# Patient Record
Sex: MALE | Race: WHITE | Employment: FULL TIME | URBAN - METROPOLITAN AREA
[De-identification: names, ages, dates, MRNs, and addresses within clinical notes are randomized per-mention and may not be internally consistent; named-entity substitution may affect disease eponyms.]

---

## 2019-01-25 ENCOUNTER — HOSPITAL ENCOUNTER (EMERGENCY)
Age: 24
Discharge: HOME | End: 2019-01-25
Payer: COMMERCIAL

## 2019-01-25 ENCOUNTER — APPOINTMENT (OUTPATIENT)
Dept: GENERAL RADIOLOGY | Age: 24
End: 2019-01-25
Payer: COMMERCIAL

## 2019-01-25 VITALS
BODY MASS INDEX: 29.62 KG/M2 | DIASTOLIC BLOOD PRESSURE: 78 MMHG | HEART RATE: 68 BPM | OXYGEN SATURATION: 98 % | TEMPERATURE: 97.8 F | WEIGHT: 200 LBS | RESPIRATION RATE: 16 BRPM | SYSTOLIC BLOOD PRESSURE: 132 MMHG | HEIGHT: 69 IN

## 2019-01-25 DIAGNOSIS — M25.552 LEFT HIP PAIN: Primary | ICD-10-CM

## 2019-01-25 PROCEDURE — 99283 EMERGENCY DEPT VISIT LOW MDM: CPT

## 2019-01-25 PROCEDURE — 73502 X-RAY EXAM HIP UNI 2-3 VIEWS: CPT

## 2019-01-25 RX ORDER — IBUPROFEN 800 MG/1
800 TABLET ORAL EVERY 8 HOURS PRN
Qty: 12 TABLET | Refills: 0 | Status: ON HOLD | OUTPATIENT
Start: 2019-01-25 | End: 2019-07-18 | Stop reason: ALTCHOICE

## 2019-01-25 RX ORDER — OLANZAPINE 10 MG/1
10 TABLET ORAL NIGHTLY
Status: ON HOLD | COMMUNITY
End: 2019-07-18 | Stop reason: ALTCHOICE

## 2019-01-25 RX ORDER — PAROXETINE 10 MG/1
10 TABLET, FILM COATED ORAL EVERY MORNING
COMMUNITY
End: 2019-07-17

## 2019-01-25 ASSESSMENT — PAIN SCALES - GENERAL: PAINLEVEL_OUTOF10: 8

## 2019-01-25 ASSESSMENT — PAIN DESCRIPTION - PAIN TYPE: TYPE: ACUTE PAIN

## 2019-01-25 ASSESSMENT — PAIN DESCRIPTION - DESCRIPTORS: DESCRIPTORS: ACHING

## 2019-01-25 ASSESSMENT — PAIN SCALES - WONG BAKER: WONGBAKER_NUMERICALRESPONSE: 2

## 2019-01-25 ASSESSMENT — PAIN DESCRIPTION - ORIENTATION: ORIENTATION: LEFT

## 2019-01-25 ASSESSMENT — PAIN DESCRIPTION - LOCATION: LOCATION: HIP

## 2019-01-25 NOTE — ED PROVIDER NOTES
HPI:  1/25/19,   Time: 8:23 AM         Gaudencio Pacheco is a 26 y.o. male presenting to the ED for left hip pain, beginning prior to arrival ago. The complaint has been persistent, moderate in severity, and worsened by walking. States that he was at work. 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 Hurts to walk. Having pain to the area. Denies head injury, neck pain, loss of consciousness, back pain. He is ambulatory but states it does hurt. ROS:   Pertinent positives and negatives are stated within HPI, all other systems reviewed and are negative.  --------------------------------------------- PAST HISTORY ---------------------------------------------  Past Medical History:  has a past medical history of Depression. Past Surgical History:  has no past surgical history on file. Social History:  reports that he has been smoking. He has never used smokeless tobacco. He reports that he uses drugs, including Marijuana. He reports that he does not drink alcohol. Family History: family history is not on file. The patients home medications have been reviewed. Allergies: Patient has no known allergies. -------------------------------------------------- RESULTS -------------------------------------------------  All laboratory and radiology results have been personally reviewed by myself   LABS:  No results found for this visit on 01/25/19. RADIOLOGY:  Interpreted by Radiologist.  XR HIP 2-3 VW W PELVIS LEFT   Final Result   No acute osseous abnormality.           ------------------------- NURSING NOTES AND VITALS REVIEWED ---------------------------   The nursing notes within the ED encounter and vital signs as below have been reviewed.    /78   Pulse 68   Temp 97.8 °F (36.6 °C) (Oral)   Resp 16   Ht 5' 9\" (1.753 m)   Wt 200 lb (90.7 kg)   SpO2 98%   BMI 29.53 kg/m²   Oxygen Saturation Interpretation: Normal      ---------------------------------------------------PHYSICAL EXAM--------------------------------------      Constitutional/General: Alert and oriented x3, well appearing, non toxic in NAD  Head: NC/AT  Eyes: PERRL, EOMI  Mouth: Oropharynx clear, handling secretions, no trismus  Neck: Supple, full ROM, no meningeal signs  Pulmonary: Lungs clear to auscultation bilaterally, no wheezes, rales, or rhonchi. Not in respiratory distress  Cardiovascular:  Regular rate and rhythm, no murmurs, gallops, or rubs. 2+ distal pulses  Abdomen: Soft, non tender, non distended,   Extremities: Moves all extremities x 4. Warm and well perfused. Pain to palpation of the left lateral hip. ROM of the left hip is intact. Distal pulses are + 2 bilaterally. Back: Negative cervical, thoracic or lumbar vertebral tenderness. Skin: warm and dry without rash  Neurologic: GCS 15,  Psych: Normal Affect      ------------------------------ ED COURSE/MEDICAL DECISION MAKING----------------------  Medications - No data to display      Medical Decision Making:    She was seen independently. Results reviewed with the patient. He will follow up at work with light duty. He will see for upper care. Warning signs discussed with the patient. She'll return for any worsening symptoms. Counseling: The emergency provider has spoken with the patient and discussed todays results, in addition to providing specific details for the plan of care and counseling regarding the diagnosis and prognosis. Questions are answered at this time and they are agreeable with the plan.      --------------------------------- IMPRESSION AND DISPOSITION ---------------------------------    IMPRESSION  1.  Left hip pain New Problem       DISPOSITION  Disposition: Discharge to home  Patient condition is stable                 Rehana Philip [place]  01/25/19 7115

## 2019-06-17 ENCOUNTER — HOSPITAL ENCOUNTER (OUTPATIENT)
Age: 24
Discharge: HOME | End: 2019-06-19
Payer: COMMERCIAL

## 2019-06-17 ENCOUNTER — HOSPITAL ENCOUNTER (OUTPATIENT)
Dept: ULTRASOUND IMAGING | Age: 24
Discharge: HOME | End: 2019-06-19
Payer: COMMERCIAL

## 2019-06-17 DIAGNOSIS — R35.0 URINARY FREQUENCY: ICD-10-CM

## 2019-06-17 PROCEDURE — 76770 US EXAM ABDO BACK WALL COMP: CPT

## 2019-07-17 ENCOUNTER — HOSPITAL ENCOUNTER (EMERGENCY)
Age: 24
Discharge: NURSING, REHAB OR LONG-TERM CARE | End: 2019-07-17
Attending: EMERGENCY MEDICINE
Payer: COMMERCIAL

## 2019-07-17 ENCOUNTER — HOSPITAL ENCOUNTER (OUTPATIENT)
Age: 24
Discharge: HOME | End: 2019-07-17
Payer: COMMERCIAL

## 2019-07-17 VITALS
RESPIRATION RATE: 18 BRPM | HEIGHT: 66 IN | WEIGHT: 200 LBS | HEART RATE: 102 BPM | OXYGEN SATURATION: 100 % | SYSTOLIC BLOOD PRESSURE: 114 MMHG | DIASTOLIC BLOOD PRESSURE: 59 MMHG | BODY MASS INDEX: 32.14 KG/M2 | TEMPERATURE: 99.2 F

## 2019-07-17 DIAGNOSIS — T43.292A BUPROPION OVERDOSE, INTENTIONAL SELF-HARM, INITIAL ENCOUNTER (HCC): Primary | ICD-10-CM

## 2019-07-17 DIAGNOSIS — T43.222A INTENTIONAL OVERDOSE OF SELECTIVE SEROTONIN REUPTAKE INHIBITOR (SSRI), INITIAL ENCOUNTER (HCC): ICD-10-CM

## 2019-07-17 DIAGNOSIS — T14.91XA SUICIDE ATTEMPT (HCC): ICD-10-CM

## 2019-07-17 PROBLEM — T50.902A DRUG OVERDOSE, INTENTIONAL SELF-HARM, INITIAL ENCOUNTER (HCC): Status: ACTIVE | Noted: 2019-07-17

## 2019-07-17 LAB
ALBUMIN SERPL-MCNC: 4.7 G/DL (ref 3.5–5.2)
ALP BLD-CCNC: 75 U/L (ref 40–129)
ALT SERPL-CCNC: 14 U/L (ref 0–40)
AMPHETAMINE SCREEN, URINE: NOT DETECTED
ANION GAP SERPL CALCULATED.3IONS-SCNC: 11 MMOL/L (ref 7–16)
AST SERPL-CCNC: 18 U/L (ref 0–39)
BARBITURATE SCREEN URINE: NOT DETECTED
BASOPHILS ABSOLUTE: 0.11 E9/L (ref 0–0.2)
BASOPHILS RELATIVE PERCENT: 1.6 % (ref 0–2)
BENZODIAZEPINE SCREEN, URINE: NOT DETECTED
BILIRUB SERPL-MCNC: 0.5 MG/DL (ref 0–1.2)
BILIRUBIN URINE: NEGATIVE
BLOOD, URINE: NEGATIVE
BUN BLDV-MCNC: 18 MG/DL (ref 6–20)
CALCIUM SERPL-MCNC: 9.3 MG/DL (ref 8.6–10.2)
CANNABINOID SCREEN URINE: NOT DETECTED
CHLORIDE BLD-SCNC: 98 MMOL/L (ref 98–107)
CLARITY: CLEAR
CO2: 28 MMOL/L (ref 22–29)
COCAINE METABOLITE SCREEN URINE: NOT DETECTED
COLOR: YELLOW
CREAT SERPL-MCNC: 1.1 MG/DL (ref 0.7–1.2)
EOSINOPHILS ABSOLUTE: 0.2 E9/L (ref 0.05–0.5)
EOSINOPHILS RELATIVE PERCENT: 2.8 % (ref 0–6)
GFR AFRICAN AMERICAN: >60
GFR NON-AFRICAN AMERICAN: >60 ML/MIN/1.73
GLUCOSE BLD-MCNC: 89 MG/DL (ref 74–99)
GLUCOSE URINE: NEGATIVE MG/DL
HCT VFR BLD CALC: 43.2 % (ref 37–54)
HEMOGLOBIN: 14.6 G/DL (ref 12.5–16.5)
IMMATURE GRANULOCYTES #: 0.02 E9/L
IMMATURE GRANULOCYTES %: 0.3 % (ref 0–5)
KETONES, URINE: NEGATIVE MG/DL
LEUKOCYTE ESTERASE, URINE: NEGATIVE
LYMPHOCYTES ABSOLUTE: 2.15 E9/L (ref 1.5–4)
LYMPHOCYTES RELATIVE PERCENT: 30.5 % (ref 20–42)
MAGNESIUM: 2 MG/DL (ref 1.6–2.6)
MCH RBC QN AUTO: 32.2 PG (ref 26–35)
MCHC RBC AUTO-ENTMCNC: 33.8 % (ref 32–34.5)
MCV RBC AUTO: 95.2 FL (ref 80–99.9)
METHADONE SCREEN, URINE: NOT DETECTED
MONOCYTES ABSOLUTE: 0.76 E9/L (ref 0.1–0.95)
MONOCYTES RELATIVE PERCENT: 10.8 % (ref 2–12)
NEUTROPHILS ABSOLUTE: 3.8 E9/L (ref 1.8–7.3)
NEUTROPHILS RELATIVE PERCENT: 54 % (ref 43–80)
NITRITE, URINE: NEGATIVE
OPIATE SCREEN URINE: NOT DETECTED
PDW BLD-RTO: 12.4 FL (ref 11.5–15)
PH UA: 7 (ref 5–9)
PHENCYCLIDINE SCREEN URINE: NOT DETECTED
PLATELET # BLD: 227 E9/L (ref 130–450)
PMV BLD AUTO: 10.3 FL (ref 7–12)
POTASSIUM SERPL-SCNC: 3.8 MMOL/L (ref 3.5–5)
PROPOXYPHENE SCREEN: NOT DETECTED
PROTEIN UA: NEGATIVE MG/DL
RBC # BLD: 4.54 E12/L (ref 3.8–5.8)
SODIUM BLD-SCNC: 137 MMOL/L (ref 132–146)
SPECIFIC GRAVITY UA: 1.02 (ref 1–1.03)
TOTAL PROTEIN: 7.1 G/DL (ref 6.4–8.3)
TROPONIN: <0.01 NG/ML (ref 0–0.03)
UROBILINOGEN, URINE: 0.2 E.U./DL
WBC # BLD: 7 E9/L (ref 4.5–11.5)

## 2019-07-17 PROCEDURE — 93005 ELECTROCARDIOGRAM TRACING: CPT | Performed by: EMERGENCY MEDICINE

## 2019-07-17 PROCEDURE — 80307 DRUG TEST PRSMV CHEM ANLYZR: CPT

## 2019-07-17 PROCEDURE — 83735 ASSAY OF MAGNESIUM: CPT

## 2019-07-17 PROCEDURE — 85025 COMPLETE CBC W/AUTO DIFF WBC: CPT

## 2019-07-17 PROCEDURE — 6360000002 HC RX W HCPCS: Performed by: EMERGENCY MEDICINE

## 2019-07-17 PROCEDURE — 80053 COMPREHEN METABOLIC PANEL: CPT

## 2019-07-17 PROCEDURE — 99291 CRITICAL CARE FIRST HOUR: CPT

## 2019-07-17 PROCEDURE — 84484 ASSAY OF TROPONIN QUANT: CPT

## 2019-07-17 PROCEDURE — 96374 THER/PROPH/DIAG INJ IV PUSH: CPT

## 2019-07-17 PROCEDURE — A0426 ALS 1: HCPCS

## 2019-07-17 PROCEDURE — G0480 DRUG TEST DEF 1-7 CLASSES: HCPCS

## 2019-07-17 PROCEDURE — A0425 GROUND MILEAGE: HCPCS

## 2019-07-17 PROCEDURE — 81003 URINALYSIS AUTO W/O SCOPE: CPT

## 2019-07-17 PROCEDURE — 96376 TX/PRO/DX INJ SAME DRUG ADON: CPT

## 2019-07-17 PROCEDURE — 2580000003 HC RX 258: Performed by: EMERGENCY MEDICINE

## 2019-07-17 RX ORDER — 0.9 % SODIUM CHLORIDE 0.9 %
1000 INTRAVENOUS SOLUTION INTRAVENOUS ONCE
Status: COMPLETED | OUTPATIENT
Start: 2019-07-17 | End: 2019-07-17

## 2019-07-17 RX ORDER — LORAZEPAM 2 MG/ML
1 INJECTION INTRAMUSCULAR ONCE
Status: COMPLETED | OUTPATIENT
Start: 2019-07-17 | End: 2019-07-17

## 2019-07-17 RX ORDER — BUPROPION HYDROCHLORIDE 150 MG/1
150 TABLET ORAL EVERY EVENING
Status: ON HOLD | COMMUNITY
End: 2019-07-24 | Stop reason: HOSPADM

## 2019-07-17 RX ORDER — CITALOPRAM 20 MG/1
20 TABLET ORAL DAILY
Status: ON HOLD | COMMUNITY
End: 2019-07-24 | Stop reason: HOSPADM

## 2019-07-17 RX ORDER — SODIUM CHLORIDE 0.9 % (FLUSH) 0.9 %
10 SYRINGE (ML) INJECTION PRN
Status: DISCONTINUED | OUTPATIENT
Start: 2019-07-17 | End: 2019-07-18 | Stop reason: HOSPADM

## 2019-07-17 RX ADMIN — LORAZEPAM 1 MG: 2 INJECTION INTRAMUSCULAR; INTRAVENOUS at 21:43

## 2019-07-17 RX ADMIN — LORAZEPAM 1 MG: 2 INJECTION INTRAMUSCULAR; INTRAVENOUS at 23:02

## 2019-07-17 RX ADMIN — SODIUM CHLORIDE 1000 ML: 9 INJECTION, SOLUTION INTRAVENOUS at 21:43

## 2019-07-17 RX ADMIN — Medication 10 ML: at 23:03

## 2019-07-18 ENCOUNTER — HOSPITAL ENCOUNTER (INPATIENT)
Age: 24
LOS: 1 days | Discharge: PSYCHIATRIC OR SUBSTANCE USE FACILITY | DRG: 918 | End: 2019-07-19
Attending: INTERNAL MEDICINE | Admitting: INTERNAL MEDICINE
Payer: COMMERCIAL

## 2019-07-18 LAB
ACETAMINOPHEN LEVEL: <5 MCG/ML (ref 10–30)
ALBUMIN SERPL-MCNC: 4.6 G/DL (ref 3.5–5.2)
ALBUMIN SERPL-MCNC: 4.6 G/DL (ref 3.5–5.2)
ALP BLD-CCNC: 64 U/L (ref 40–129)
ALP BLD-CCNC: 65 U/L (ref 40–129)
ALT SERPL-CCNC: 14 U/L (ref 0–40)
ALT SERPL-CCNC: 16 U/L (ref 0–40)
ANION GAP SERPL CALCULATED.3IONS-SCNC: 10 MMOL/L (ref 7–16)
ANION GAP SERPL CALCULATED.3IONS-SCNC: 11 MMOL/L (ref 7–16)
ANION GAP SERPL CALCULATED.3IONS-SCNC: 14 MMOL/L (ref 7–16)
ANION GAP SERPL CALCULATED.3IONS-SCNC: 7 MMOL/L (ref 7–16)
AST SERPL-CCNC: 17 U/L (ref 0–39)
AST SERPL-CCNC: 17 U/L (ref 0–39)
BASOPHILS ABSOLUTE: 0.08 E9/L (ref 0–0.2)
BASOPHILS RELATIVE PERCENT: 0.9 % (ref 0–2)
BILIRUB SERPL-MCNC: 0.6 MG/DL (ref 0–1.2)
BILIRUB SERPL-MCNC: 0.9 MG/DL (ref 0–1.2)
BUN BLDV-MCNC: 12 MG/DL (ref 6–20)
BUN BLDV-MCNC: 12 MG/DL (ref 6–20)
BUN BLDV-MCNC: 16 MG/DL (ref 6–20)
BUN BLDV-MCNC: 20 MG/DL (ref 6–20)
CALCIUM IONIZED: 1.05 MMOL/L (ref 1.15–1.33)
CALCIUM SERPL-MCNC: 6.1 MG/DL (ref 8.6–10.2)
CALCIUM SERPL-MCNC: 6.8 MG/DL (ref 8.6–10.2)
CALCIUM SERPL-MCNC: 9 MG/DL (ref 8.6–10.2)
CALCIUM SERPL-MCNC: 9.1 MG/DL (ref 8.6–10.2)
CHLORIDE BLD-SCNC: 103 MMOL/L (ref 98–107)
CHLORIDE BLD-SCNC: 106 MMOL/L (ref 98–107)
CHLORIDE BLD-SCNC: 117 MMOL/L (ref 98–107)
CHLORIDE BLD-SCNC: 117 MMOL/L (ref 98–107)
CO2: 23 MMOL/L (ref 22–29)
CO2: 28 MMOL/L (ref 22–29)
CREAT SERPL-MCNC: 0.8 MG/DL (ref 0.7–1.2)
CREAT SERPL-MCNC: 0.9 MG/DL (ref 0.7–1.2)
CREAT SERPL-MCNC: 1.1 MG/DL (ref 0.7–1.2)
CREAT SERPL-MCNC: 1.1 MG/DL (ref 0.7–1.2)
EOSINOPHILS ABSOLUTE: 0.03 E9/L (ref 0.05–0.5)
EOSINOPHILS RELATIVE PERCENT: 0.3 % (ref 0–6)
ETHANOL: <10 MG/DL (ref 0–0.08)
GFR AFRICAN AMERICAN: >60
GFR NON-AFRICAN AMERICAN: >60 ML/MIN/1.73
GLUCOSE BLD-MCNC: 100 MG/DL (ref 74–99)
GLUCOSE BLD-MCNC: 102 MG/DL (ref 74–99)
GLUCOSE BLD-MCNC: 114 MG/DL (ref 74–99)
GLUCOSE BLD-MCNC: 79 MG/DL (ref 74–99)
HCT VFR BLD CALC: 43.7 % (ref 37–54)
HEMOGLOBIN: 14.9 G/DL (ref 12.5–16.5)
IMMATURE GRANULOCYTES #: 0.03 E9/L
IMMATURE GRANULOCYTES %: 0.3 % (ref 0–5)
LYMPHOCYTES ABSOLUTE: 1.29 E9/L (ref 1.5–4)
LYMPHOCYTES RELATIVE PERCENT: 15 % (ref 20–42)
MAGNESIUM: 1.6 MG/DL (ref 1.6–2.6)
MAGNESIUM: 1.6 MG/DL (ref 1.6–2.6)
MAGNESIUM: 2 MG/DL (ref 1.6–2.6)
MCH RBC QN AUTO: 31.9 PG (ref 26–35)
MCHC RBC AUTO-ENTMCNC: 34.1 % (ref 32–34.5)
MCV RBC AUTO: 93.6 FL (ref 80–99.9)
MONOCYTES ABSOLUTE: 0.54 E9/L (ref 0.1–0.95)
MONOCYTES RELATIVE PERCENT: 6.3 % (ref 2–12)
NEUTROPHILS ABSOLUTE: 6.61 E9/L (ref 1.8–7.3)
NEUTROPHILS RELATIVE PERCENT: 77.2 % (ref 43–80)
PDW BLD-RTO: 12.3 FL (ref 11.5–15)
PHOSPHORUS: 3 MG/DL (ref 2.5–4.5)
PHOSPHORUS: 3.3 MG/DL (ref 2.5–4.5)
PLATELET # BLD: 230 E9/L (ref 130–450)
PMV BLD AUTO: 9.9 FL (ref 7–12)
POTASSIUM REFLEX MAGNESIUM: 4.3 MMOL/L (ref 3.5–5)
POTASSIUM REFLEX MAGNESIUM: 4.3 MMOL/L (ref 3.5–5)
POTASSIUM SERPL-SCNC: 2.8 MMOL/L (ref 3.5–5)
POTASSIUM SERPL-SCNC: 3 MMOL/L (ref 3.5–5)
RBC # BLD: 4.67 E12/L (ref 3.8–5.8)
SALICYLATE, SERUM: <0.3 MG/DL (ref 0–30)
SODIUM BLD-SCNC: 140 MMOL/L (ref 132–146)
SODIUM BLD-SCNC: 145 MMOL/L (ref 132–146)
SODIUM BLD-SCNC: 147 MMOL/L (ref 132–146)
SODIUM BLD-SCNC: 150 MMOL/L (ref 132–146)
TOTAL PROTEIN: 7 G/DL (ref 6.4–8.3)
TOTAL PROTEIN: 7.1 G/DL (ref 6.4–8.3)
TRICYCLIC ANTIDEPRESSANTS SCREEN SERUM: NEGATIVE NG/ML
WBC # BLD: 8.6 E9/L (ref 4.5–11.5)

## 2019-07-18 PROCEDURE — 80048 BASIC METABOLIC PNL TOTAL CA: CPT

## 2019-07-18 PROCEDURE — 80053 COMPREHEN METABOLIC PANEL: CPT

## 2019-07-18 PROCEDURE — 82330 ASSAY OF CALCIUM: CPT

## 2019-07-18 PROCEDURE — 2580000003 HC RX 258: Performed by: INTERNAL MEDICINE

## 2019-07-18 PROCEDURE — 83735 ASSAY OF MAGNESIUM: CPT

## 2019-07-18 PROCEDURE — 6370000000 HC RX 637 (ALT 250 FOR IP): Performed by: INTERNAL MEDICINE

## 2019-07-18 PROCEDURE — 84100 ASSAY OF PHOSPHORUS: CPT

## 2019-07-18 PROCEDURE — 93005 ELECTROCARDIOGRAM TRACING: CPT | Performed by: INTERNAL MEDICINE

## 2019-07-18 PROCEDURE — 2000000000 HC ICU R&B

## 2019-07-18 PROCEDURE — 85025 COMPLETE CBC W/AUTO DIFF WBC: CPT

## 2019-07-18 PROCEDURE — 6360000002 HC RX W HCPCS: Performed by: INTERNAL MEDICINE

## 2019-07-18 PROCEDURE — 36415 COLL VENOUS BLD VENIPUNCTURE: CPT

## 2019-07-18 RX ORDER — NICOTINE 21 MG/24HR
1 PATCH, TRANSDERMAL 24 HOURS TRANSDERMAL DAILY
Status: DISCONTINUED | OUTPATIENT
Start: 2019-07-18 | End: 2019-07-19 | Stop reason: HOSPADM

## 2019-07-18 RX ORDER — POTASSIUM CHLORIDE 7.45 MG/ML
10 INJECTION INTRAVENOUS
Status: COMPLETED | OUTPATIENT
Start: 2019-07-18 | End: 2019-07-18

## 2019-07-18 RX ORDER — MAGNESIUM SULFATE IN WATER 40 MG/ML
2 INJECTION, SOLUTION INTRAVENOUS ONCE
Status: COMPLETED | OUTPATIENT
Start: 2019-07-18 | End: 2019-07-18

## 2019-07-18 RX ORDER — SODIUM CHLORIDE 0.9 % (FLUSH) 0.9 %
10 SYRINGE (ML) INJECTION EVERY 12 HOURS SCHEDULED
Status: DISCONTINUED | OUTPATIENT
Start: 2019-07-18 | End: 2019-07-19 | Stop reason: HOSPADM

## 2019-07-18 RX ORDER — ONDANSETRON 2 MG/ML
4 INJECTION INTRAMUSCULAR; INTRAVENOUS EVERY 6 HOURS PRN
Status: DISCONTINUED | OUTPATIENT
Start: 2019-07-18 | End: 2019-07-19 | Stop reason: HOSPADM

## 2019-07-18 RX ORDER — POTASSIUM CHLORIDE 20 MEQ/1
40 TABLET, EXTENDED RELEASE ORAL ONCE
Status: COMPLETED | OUTPATIENT
Start: 2019-07-18 | End: 2019-07-18

## 2019-07-18 RX ORDER — DEXTROSE AND SODIUM CHLORIDE 5; .45 G/100ML; G/100ML
INJECTION, SOLUTION INTRAVENOUS CONTINUOUS
Status: ACTIVE | OUTPATIENT
Start: 2019-07-18 | End: 2019-07-18

## 2019-07-18 RX ORDER — SODIUM CHLORIDE 0.9 % (FLUSH) 0.9 %
10 SYRINGE (ML) INJECTION PRN
Status: DISCONTINUED | OUTPATIENT
Start: 2019-07-18 | End: 2019-07-19 | Stop reason: HOSPADM

## 2019-07-18 RX ORDER — SODIUM CHLORIDE 9 MG/ML
INJECTION, SOLUTION INTRAVENOUS CONTINUOUS
Status: DISCONTINUED | OUTPATIENT
Start: 2019-07-18 | End: 2019-07-18

## 2019-07-18 RX ADMIN — SODIUM CHLORIDE: 9 INJECTION, SOLUTION INTRAVENOUS at 01:41

## 2019-07-18 RX ADMIN — POTASSIUM CHLORIDE 10 MEQ: 7.46 INJECTION, SOLUTION INTRAVENOUS at 17:56

## 2019-07-18 RX ADMIN — Medication 10 ML: at 09:45

## 2019-07-18 RX ADMIN — DEXTROSE AND SODIUM CHLORIDE: 5; 450 INJECTION, SOLUTION INTRAVENOUS at 03:04

## 2019-07-18 RX ADMIN — MAGNESIUM SULFATE 2 G: 2 INJECTION INTRAVENOUS at 17:32

## 2019-07-18 RX ADMIN — POTASSIUM CHLORIDE 10 MEQ: 7.46 INJECTION, SOLUTION INTRAVENOUS at 21:12

## 2019-07-18 RX ADMIN — POTASSIUM CHLORIDE 10 MEQ: 7.46 INJECTION, SOLUTION INTRAVENOUS at 23:05

## 2019-07-18 RX ADMIN — ENOXAPARIN SODIUM 40 MG: 40 INJECTION SUBCUTANEOUS at 09:45

## 2019-07-18 RX ADMIN — POTASSIUM CHLORIDE 10 MEQ: 7.46 INJECTION, SOLUTION INTRAVENOUS at 18:49

## 2019-07-18 RX ADMIN — POTASSIUM CHLORIDE 40 MEQ: 20 TABLET, EXTENDED RELEASE ORAL at 17:32

## 2019-07-18 ASSESSMENT — PAIN SCALES - GENERAL
PAINLEVEL_OUTOF10: 0

## 2019-07-18 NOTE — CONSULTS
extremities normal, atraumatic, no cyanosis  Musculokeletal: No joint swelling, no muscle tenderness. ROM normal in all joints of extremities. Neurologic: Mental status: Alert, oriented,no gross focal neurologic deficit    Labs and Imaging Studies   Basic Labs  Recent Labs     07/17/19  2118      K 3.8   CL 98   CO2 28   BUN 18   CREATININE 1.1   GLUCOSE 89   CALCIUM 9.3       Recent Labs     07/17/19  2118   WBC 7.0   RBC 4.54   HGB 14.6   HCT 43.2   MCV 95.2   MCH 32.2   MCHC 33.8   RDW 12.4      MPV 10.3     EKG: Sinus bradycardia    Resident's Assessment and Plan     Jose Bonilla is a 26 y.o. male with a PMH of major depressive disorder and panic attack presented after overdosing of Citalopram and Bupropion. H/H stable, electrolytes and renal parameters within normal limit. EKG showed sinus bradycardia. 1.Suicidal attempt secondary to major depressive disorder:  -Overdosed with 25 pills of Citalopram and 20 pills of Bupropion  -Had confusion, visual and auditory hallucination  -Poison control recommended to monitor EKG for QTc prolongation, monitor electrolytes and watch for seizure (this might be a delayed presentation in the setting of Wellbutrin OD, up to 18 hrs)  -Will check CBC, CMP, Mg, Phos  -EKG showed sinus bradycardia  -Will repeat EKG   -UDS negative  -Constant observation  -With Bupropion, it might take upto 18 hours for patients to have seizure  -Will consult psychiatry    2. Major depressive disorder:  -Has been following up with psychiatrist  -Home medications: Agnes Goon (started one week ago), Olanzepine  -Will hold antidepressants and antipsychotics for now    PT/OT evaluation: Not done  DVT prophylaxis/ GI prophylaxis:  Enoxaparin/Diet  Disposition: ICU    Darius Rand MD, PGY-1   Attending physician: Dr. Michelle Villarreal      Senior Resident Statement  I have seen and examined the patient with the intern.  I have discussed the case, including pertinent

## 2019-07-18 NOTE — H&P
43.2 37.0 - 54.0 %     MCV 95.2 80.0 - 99.9 fL     MCH 32.2 26.0 - 35.0 pg     MCHC 33.8 32.0 - 34.5 %     RDW 12.4 11.5 - 15.0 fL     Platelets 370 424 - 496 E9/L     MPV 10.3 7.0 - 12.0 fL     Neutrophils % 54.0 43.0 - 80.0 %     Immature Granulocytes % 0.3 0.0 - 5.0 %     Lymphocytes % 30.5 20.0 - 42.0 %     Monocytes % 10.8 2.0 - 12.0 %     Eosinophils % 2.8 0.0 - 6.0 %     Basophils % 1.6 0.0 - 2.0 %     Neutrophils # 3.80 1.80 - 7.30 E9/L     Immature Granulocytes # 0.02 E9/L     Lymphocytes # 2.15 1.50 - 4.00 E9/L     Monocytes # 0.76 0.10 - 0.95 E9/L     Eosinophils # 0.20 0.05 - 0.50 E9/L     Basophils # 0.11 0.00 - 0.20 E9/L   Comprehensive Metabolic Panel   Result Value Ref Range     Sodium 137 132 - 146 mmol/L     Potassium 3.8 3.5 - 5.0 mmol/L     Chloride 98 98 - 107 mmol/L     CO2 28 22 - 29 mmol/L     Anion Gap 11 7 - 16 mmol/L     Glucose 89 74 - 99 mg/dL     BUN 18 6 - 20 mg/dL     CREATININE 1.1 0.7 - 1.2 mg/dL     GFR Non-African American >60 >=60 mL/min/1.73     GFR African American >60       Calcium 9.3 8.6 - 10.2 mg/dL     Total Protein 7.1 6.4 - 8.3 g/dL     Alb 4.7 3.5 - 5.2 g/dL     Total Bilirubin 0.5 0.0 - 1.2 mg/dL     Alkaline Phosphatase 75 40 - 129 U/L     ALT 14 0 - 40 U/L     AST 18 0 - 39 U/L   Urinalysis   Result Value Ref Range     Color, UA Yellow Straw/Yellow     Clarity, UA Clear Clear     Glucose, Ur Negative Negative mg/dL     Bilirubin Urine Negative Negative     Ketones, Urine Negative Negative mg/dL     Specific Gravity, UA 1.020 1.005 - 1.030     Blood, Urine Negative Negative     pH, UA 7.0 5.0 - 9.0     Protein, UA Negative Negative mg/dL     Urobilinogen, Urine 0.2 <2.0 E.U./dL     Nitrite, Urine Negative Negative     Leukocyte Esterase, Urine Negative Negative   Serum Drug Screen   Result Value Ref Range     Ethanol Lvl <10 mg/dL     Acetaminophen Level <5.0 (L) 10.0 - 27.4 mcg/mL     Salicylate, Serum <6.8 0.0 - 30.0 mg/dL   Urine Drug Screen   Result Value Ref

## 2019-07-19 ENCOUNTER — HOSPITAL ENCOUNTER (INPATIENT)
Age: 24
LOS: 5 days | Discharge: HOME | DRG: 881 | End: 2019-07-24
Attending: PSYCHIATRY & NEUROLOGY | Admitting: PSYCHIATRY & NEUROLOGY
Payer: COMMERCIAL

## 2019-07-19 VITALS
DIASTOLIC BLOOD PRESSURE: 60 MMHG | OXYGEN SATURATION: 94 % | WEIGHT: 188.9 LBS | HEART RATE: 62 BPM | HEIGHT: 66 IN | SYSTOLIC BLOOD PRESSURE: 100 MMHG | RESPIRATION RATE: 17 BRPM | BODY MASS INDEX: 30.36 KG/M2 | TEMPERATURE: 98 F

## 2019-07-19 PROBLEM — F32.9 MAJOR DEPRESSION, SINGLE EPISODE: Status: ACTIVE | Noted: 2019-07-19

## 2019-07-19 LAB
ANION GAP SERPL CALCULATED.3IONS-SCNC: 13 MMOL/L (ref 7–16)
BUN BLDV-MCNC: 15 MG/DL (ref 6–20)
CALCIUM IONIZED: 1.31 MMOL/L (ref 1.15–1.33)
CALCIUM SERPL-MCNC: 9.3 MG/DL (ref 8.6–10.2)
CHLORIDE BLD-SCNC: 103 MMOL/L (ref 98–107)
CO2: 23 MMOL/L (ref 22–29)
CREAT SERPL-MCNC: 1.1 MG/DL (ref 0.7–1.2)
EKG ATRIAL RATE: 101 BPM
EKG ATRIAL RATE: 104 BPM
EKG ATRIAL RATE: 57 BPM
EKG P AXIS: 22 DEGREES
EKG P AXIS: 23 DEGREES
EKG P AXIS: 25 DEGREES
EKG P-R INTERVAL: 142 MS
EKG P-R INTERVAL: 146 MS
EKG P-R INTERVAL: 148 MS
EKG Q-T INTERVAL: 350 MS
EKG Q-T INTERVAL: 362 MS
EKG Q-T INTERVAL: 412 MS
EKG QRS DURATION: 78 MS
EKG QRS DURATION: 86 MS
EKG QRS DURATION: 88 MS
EKG QTC CALCULATION (BAZETT): 401 MS
EKG QTC CALCULATION (BAZETT): 453 MS
EKG QTC CALCULATION (BAZETT): 476 MS
EKG R AXIS: -4 DEGREES
EKG R AXIS: -6 DEGREES
EKG R AXIS: 8 DEGREES
EKG T AXIS: 31 DEGREES
EKG T AXIS: 31 DEGREES
EKG T AXIS: 39 DEGREES
EKG VENTRICULAR RATE: 101 BPM
EKG VENTRICULAR RATE: 104 BPM
EKG VENTRICULAR RATE: 57 BPM
GFR AFRICAN AMERICAN: >60
GFR NON-AFRICAN AMERICAN: >60 ML/MIN/1.73
GLUCOSE BLD-MCNC: 89 MG/DL (ref 74–99)
POTASSIUM SERPL-SCNC: 4.9 MMOL/L (ref 3.5–5)
SODIUM BLD-SCNC: 139 MMOL/L (ref 132–146)

## 2019-07-19 PROCEDURE — 82330 ASSAY OF CALCIUM: CPT

## 2019-07-19 PROCEDURE — 6360000002 HC RX W HCPCS: Performed by: INTERNAL MEDICINE

## 2019-07-19 PROCEDURE — 1240000000 HC EMOTIONAL WELLNESS R&B

## 2019-07-19 PROCEDURE — 93010 ELECTROCARDIOGRAM REPORT: CPT | Performed by: INTERNAL MEDICINE

## 2019-07-19 PROCEDURE — 36415 COLL VENOUS BLD VENIPUNCTURE: CPT

## 2019-07-19 PROCEDURE — 2580000003 HC RX 258: Performed by: INTERNAL MEDICINE

## 2019-07-19 PROCEDURE — 6370000000 HC RX 637 (ALT 250 FOR IP): Performed by: INTERNAL MEDICINE

## 2019-07-19 PROCEDURE — 80048 BASIC METABOLIC PNL TOTAL CA: CPT

## 2019-07-19 RX ORDER — HYDROXYZINE PAMOATE 25 MG/1
50 CAPSULE ORAL 3 TIMES DAILY PRN
Status: DISCONTINUED | OUTPATIENT
Start: 2019-07-19 | End: 2019-07-24 | Stop reason: HOSPADM

## 2019-07-19 RX ORDER — OLANZAPINE 10 MG/1
10 INJECTION, POWDER, LYOPHILIZED, FOR SOLUTION INTRAMUSCULAR EVERY 4 HOURS PRN
Status: DISCONTINUED | OUTPATIENT
Start: 2019-07-19 | End: 2019-07-24 | Stop reason: HOSPADM

## 2019-07-19 RX ORDER — SODIUM CHLORIDE 0.9 % (FLUSH) 0.9 %
10 SYRINGE (ML) INJECTION PRN
Status: DISCONTINUED | OUTPATIENT
Start: 2019-07-19 | End: 2019-07-19

## 2019-07-19 RX ORDER — SODIUM CHLORIDE 0.9 % (FLUSH) 0.9 %
10 SYRINGE (ML) INJECTION PRN
Status: CANCELLED | OUTPATIENT
Start: 2019-07-19

## 2019-07-19 RX ORDER — NICOTINE 21 MG/24HR
1 PATCH, TRANSDERMAL 24 HOURS TRANSDERMAL DAILY
Status: CANCELLED | OUTPATIENT
Start: 2019-07-20

## 2019-07-19 RX ORDER — OLANZAPINE 5 MG/1
5 TABLET ORAL EVERY 4 HOURS PRN
Status: DISCONTINUED | OUTPATIENT
Start: 2019-07-19 | End: 2019-07-24 | Stop reason: HOSPADM

## 2019-07-19 RX ORDER — ACETAMINOPHEN 325 MG/1
650 TABLET ORAL EVERY 4 HOURS PRN
Status: DISCONTINUED | OUTPATIENT
Start: 2019-07-19 | End: 2019-07-24 | Stop reason: HOSPADM

## 2019-07-19 RX ORDER — SODIUM CHLORIDE 0.9 % (FLUSH) 0.9 %
10 SYRINGE (ML) INJECTION EVERY 12 HOURS SCHEDULED
Status: DISCONTINUED | OUTPATIENT
Start: 2019-07-19 | End: 2019-07-19

## 2019-07-19 RX ORDER — TRAZODONE HYDROCHLORIDE 50 MG/1
50 TABLET ORAL NIGHTLY PRN
Status: DISCONTINUED | OUTPATIENT
Start: 2019-07-19 | End: 2019-07-24 | Stop reason: HOSPADM

## 2019-07-19 RX ORDER — MAGNESIUM HYDROXIDE/ALUMINUM HYDROXICE/SIMETHICONE 120; 1200; 1200 MG/30ML; MG/30ML; MG/30ML
30 SUSPENSION ORAL PRN
Status: DISCONTINUED | OUTPATIENT
Start: 2019-07-19 | End: 2019-07-24 | Stop reason: HOSPADM

## 2019-07-19 RX ORDER — BENZTROPINE MESYLATE 1 MG/ML
2 INJECTION INTRAMUSCULAR; INTRAVENOUS 2 TIMES DAILY PRN
Status: DISCONTINUED | OUTPATIENT
Start: 2019-07-19 | End: 2019-07-24 | Stop reason: HOSPADM

## 2019-07-19 RX ORDER — SODIUM CHLORIDE 0.9 % (FLUSH) 0.9 %
10 SYRINGE (ML) INJECTION EVERY 12 HOURS SCHEDULED
Status: CANCELLED | OUTPATIENT
Start: 2019-07-19

## 2019-07-19 RX ORDER — NICOTINE 21 MG/24HR
1 PATCH, TRANSDERMAL 24 HOURS TRANSDERMAL DAILY
Status: DISCONTINUED | OUTPATIENT
Start: 2019-07-20 | End: 2019-07-19

## 2019-07-19 RX ORDER — NICOTINE 21 MG/24HR
1 PATCH, TRANSDERMAL 24 HOURS TRANSDERMAL DAILY
Status: DISCONTINUED | OUTPATIENT
Start: 2019-07-19 | End: 2019-07-24 | Stop reason: HOSPADM

## 2019-07-19 RX ADMIN — Medication 10 ML: at 09:24

## 2019-07-19 RX ADMIN — ENOXAPARIN SODIUM 40 MG: 40 INJECTION SUBCUTANEOUS at 09:24

## 2019-07-19 ASSESSMENT — PAIN SCALES - GENERAL
PAINLEVEL_OUTOF10: 0
PAINLEVEL_OUTOF10: 0

## 2019-07-19 ASSESSMENT — SLEEP AND FATIGUE QUESTIONNAIRES
DO YOU USE A SLEEP AID: NO
AVERAGE NUMBER OF SLEEP HOURS: 8
DO YOU HAVE DIFFICULTY SLEEPING: NO

## 2019-07-19 ASSESSMENT — PATIENT HEALTH QUESTIONNAIRE - PHQ9: SUM OF ALL RESPONSES TO PHQ QUESTIONS 1-9: 10

## 2019-07-19 ASSESSMENT — LIFESTYLE VARIABLES: HISTORY_ALCOHOL_USE: NO

## 2019-07-19 NOTE — PROGRESS NOTES
Daily    nicotine  1 patch Transdermal Daily     PRN Meds: sodium chloride flush, magnesium hydroxide, ondansetron  Nutrition:   Normal diet     Labs and Imaging Studies     CBC:   Recent Labs     07/17/19  2118 07/18/19  0520   WBC 7.0 8.6   HGB 14.6 14.9   HCT 43.2 43.7   MCV 95.2 93.6    230       BMP:    Recent Labs     07/18/19  1225 07/18/19  1711 07/19/19  0056   * 147* 139   K 3.0* 2.8* 4.9   * 117* 103   CO2 23 23 23   BUN 12 12 15   CREATININE 0.9 0.8 1.1   GLUCOSE 100* 79 89       LIVER PROFILE:   Recent Labs     07/17/19 2118 07/18/19  0138 07/18/19  0520   AST 18 17 17   ALT 14 14 16   BILITOT 0.5 0.9 0.6   ALKPHOS 75 65 64       PT/INR:   No results for input(s): PROTIME, INR in the last 72 hours. APTT:   No results for input(s): APTT in the last 72 hours. Fasting Lipid Panel:    No results found for: CHOL, TRIG, HDL    Cardiac Enzymes:    Lab Results   Component Value Date    TROPONINI <0.01 07/17/2019       Notable Cultures:      Blood cultures No results found for: BC  Respiratory cultures No results found for: RESPCULTURE No results found for: LABGRAM  Urine No results found for: LABURIN  Legionella No results found for: LABLEGI  C Diff PCR No results found for: CDIFPCR  Wound culture/abscess: No results for input(s): WNDABS in the last 72 hours. Tip culture:No results for input(s): CXCATHTIP in the last 72 hours. Antibiotic  Days  Day started                                Oxygen:         Additional Respiratory  Assessments  Pulse: 62  Resp: 17  SpO2: 94 %     Nasal cannula L/min     Face mask %     Reservoirs mask %       ABG   Vent Settings     PH   Mode      PCO2   TV      PO2   RR      HCO3   PS      Sat%   PEEP      FIO2   FIO2        P/F          Lines:  Site  Day  Date inserted     TLC              PICC              Arterial line              Peripheral line              HD cath                 Imaging Studies:    EKG:     Resident's Assessment and Plan

## 2019-07-19 NOTE — PLAN OF CARE
Problem: Suicide risk  Goal: Provide patient with safe environment  Description  Provide patient with safe environment  7/19/2019 0715 by Branden Alfaro RN  Outcome: Met This Shift

## 2019-07-20 PROCEDURE — 1240000000 HC EMOTIONAL WELLNESS R&B

## 2019-07-20 PROCEDURE — 99221 1ST HOSP IP/OBS SF/LOW 40: CPT | Performed by: NURSE PRACTITIONER

## 2019-07-20 RX ORDER — CITALOPRAM 20 MG/1
40 TABLET ORAL DAILY
Status: DISCONTINUED | OUTPATIENT
Start: 2019-07-20 | End: 2019-07-24 | Stop reason: HOSPADM

## 2019-07-20 ASSESSMENT — SLEEP AND FATIGUE QUESTIONNAIRES
AVERAGE NUMBER OF SLEEP HOURS: 8
DO YOU USE A SLEEP AID: NO
DO YOU HAVE DIFFICULTY SLEEPING: NO

## 2019-07-20 ASSESSMENT — PAIN SCALES - GENERAL: PAINLEVEL_OUTOF10: 0

## 2019-07-20 ASSESSMENT — PATIENT HEALTH QUESTIONNAIRE - PHQ9: SUM OF ALL RESPONSES TO PHQ QUESTIONS 1-9: 10

## 2019-07-20 ASSESSMENT — LIFESTYLE VARIABLES: HISTORY_ALCOHOL_USE: NO

## 2019-07-20 NOTE — CARE COORDINATION
SW met with pt to complete psychosocial assessment and CSSR-S. Pt was cooperative during time of assessment. Pt's mood depressed and anxious, affect congruent. Pt alert and oriented x4. Pt denied present SI/HI. Pt denied AH/VH. Pt admitted to unit following OD on psych meds. Pt stated that his depression has become increasingly worse over the past few months which prompted his OD. Pt reported that today he does not feeling like hurting himself and has a desire to live. Pt currently lives in the home with his mom and step-father who are supportive of pt. Pt stated that he was kicked out of his home 2x in past due to smoking marijuana. Pt has not used marijuana for the past 3 weeks. Pt is employed and working for a  distributor. Pt actively treats with Corin Weller NP at [address] [place] [address] in [facility] and therapist Dr. Matt Benoit at [facility] in [facility]. Discussed IOP although pt stated that it would not work in his work schedule. Pt would like to continue services with current agencies.

## 2019-07-20 NOTE — H&P
PSYCHIATRIC EVALUATION  (HISTORY & PHYSICAL)     CHIEF COMPLAINT:   [x] Mood Problems [] Anxiety Problems [] Psychosis                                          [x] Suicidal/Homicidal   [] Aggression  [] Other     HISTORY OF PRESENT ILLNESS: Swetha Templeton  is a 26 y.o. male who has a previous psychiatric history of depression and suicide attempt and presents for admission with after overdosing on celexa and wellbutrin. Patient states he has felt increasingly depressed and SI for the last few weeks. Denies HI or AVH.   Symptoms are constant, severe, and worsened by nothing.      Precipitating Factors:      [] Family Stress   [] Recent loss/grief Stress   [] Health Stress   [] Relationship Stress    [] Legal Stress   [x] Environmental Stress    [] Occupational Stress   [] Financial Stress   [] Substance Abuse [] Other      PAST PSYCHIATRIC HISTORY:   History of psychiatric Hospitalization:     [] Denies     [x] yes  [] Days ago        []  Weeks Ago    [] Months ago  [x] Years ago              [] [address]  [] [facility]  [] Other:        [x] Once  [] More than once     Outpatient treatment:  [] [facility] PSYCH[facility]  [] [facility]  [] [facility] [employer]                                      [] [employer]  [] [facility]                                       [x] [facility] [] [facility]                                       [] [facility] [] [facility]  [] [facility] [] [facility]                                         [x] currently  [] in the past  [] Non-Compliant    [] Denies     Previous suicide attempt: []Denies                                [x] yes  [x] OD  [x] Cutting  [] Hanging  [] Gun  [] Other     Previous psych medications:  [] Was prescribed                                                   [x] Currently Taking                                                   [] Never taken medications      PAST MEDICAL HISTORY:       Diagnosis Date    Depression        FAMILY PSYCHIATRIC HISTORY:  No family history on file.     [x] Denies

## 2019-07-21 PROCEDURE — 1240000000 HC EMOTIONAL WELLNESS R&B

## 2019-07-21 PROCEDURE — 99231 SBSQ HOSP IP/OBS SF/LOW 25: CPT | Performed by: NURSE PRACTITIONER

## 2019-07-21 ASSESSMENT — PAIN SCALES - GENERAL: PAINLEVEL_OUTOF10: 0

## 2019-07-21 NOTE — GROUP NOTE
Group Therapy Note    Date: July 20    Group Start Time: 2000  Group End Time: 2030  Group Topic: Healthy Living/Wellness    [facility]    Dot Maddox, RN        Group Therapy Note      Pt attended and participated in wellness group and wrap up group.        Attendees: 17/24

## 2019-07-21 NOTE — PROGRESS NOTES
DATE OF SERVICE:     7/21/2019    Shanika Meraz seen today for the purpose of continuation of care. Nursing, social work reports, laboratory studies and vital signs are reviewed. Patient chief complaint today is:             [x] Depression      [x] Anxiety        [] Psychosis         [x] Suicidal/Homicidal                         [] Delusions           [] Aggression          Subjective: Today patient states that he is not wanting to be on Celexa anymore, states that he is not feeling suicidal currently and feels less depressed. Patient still appears sad. Sleep:  [x] Good [] Fair  [] Poor  Appetite:  [x] Good [] Fair  [] Poor    Depression:  [] Mild [] Moderate [x] Severe                [x] Constant [] Sporadic     Anxiety: [] Mild [x] Moderate [] Severe    [x] Constant [] Sporadic     Delusions: [] Mild [] Moderate [] Severe     [] Constant [] Sporadic     [] Paranoid [] Somatic [] Grandiose     Hallucinations: [] Mild [] Moderate [] Severe     [] Constant [] Sporadic    [] Auditory  [] Visual [] Tactile       Suicidal: [] Constant [x] Sporadic  Homicidal: [] Constant [] Sporadic    Unscheduled Medications     [] Patient Receiving Emergency Medications \" Chemical Restraint\"   [] Requesting PRN medications for anxiety    Medical Review of Systems:     All other than marked systmes have been reviewed and are all negative.     Constitutional Symptoms: []  fever []  Chills  Skin Symptoms: [] rash []  Pruritus   Eye Symptoms: [] Vision unchanged []  recent vision problems[] blurred vision   Respiratory Symptoms:[] shortness of breath [] cough  Cardiovascular Symptoms:  [] chest pain   [] palpitations   Gastrointestinal Symptoms: []  abdominal pain []  nausea []  vomiting []  diarrhea  Genitourinary Symptoms: []  dysuria  []  hematuria   Musculoskeletal Symptoms: []  back pain []  muscle pain []  joint pain  Neurologic Symptoms: []  headache []  dizziness  Hematolymphoid Symptoms: [] Adenopathy [] Bruises [] Schimosis       Psychiatric Review of systems  Delusions:  [] Denies [] Endorses   Withdrawals:  [] Denies [] Endorses    Hallucinations: [] Denies [] Endorses    Extra Pyramidal Symptoms: [] Denies [] Endorses      BP (!) 92/57   Pulse 53   Temp 97.9 °F (36.6 °C) (Oral)   Resp 14   Ht 5' 6\" (1.676 m)   Wt 188 lb (85.3 kg)   SpO2 99%   BMI 30.34 kg/m²     Mental Status Examination:    Cognition:       [x] Alert  [x] Awake  [x] Oriented  [x] Person  [x] Place [x] Time       [] drowsy  [] tired  [] lethargic  [] distractable  []      Attention/Concentration:   [x] Attentive  [] Distracted         Memory Recent and Remote: [x] Intact   [] Impaired [] Partially Impaired      Language: [] Able to recognize and name objects                         [] Unable to recognize and name [address] [place] [address] of Knowledge:  [] Poor []  Fair  [x] Good     Speech: [] Normal  [x] Soft  [] Slow  [] Fast [] Pressured                                    [] Loud [] Dysarthria  [] Incoherent        Appearance: [] Well Groomed  [x] Casual Dressed  [] Unkept  [] Disheveled                         [] Normal weight[] Thin  [] Overweight  [] Obese       Attitude: [] Positive  [] Hostile  [] Demanding  [] Guarded  [] Defensive                    [x] Cooperative  []  Uncooperative       Behavior:  [] Normal Gait  [] Walks with Assistance  [] Divya Chair               [] LICDM with [address] Bed  [] Sitting in Chair     Muscle-Skeletal:  [x] Normal Muscle Tone [] Muscle Atrophy                                        [] Abnormal Muscle Movement      Eye Contact:   [x] Good eye contact  [] Intermittent Eye Contact  [] Poor Eye Contact      Mood: [x] Depressed  [x] Anxious  [] Irritated  [] Euthymic   [] Angry [] Restless     Affect:  [x] Congruent  [] Incongruent  [] Labile  [] Constricted  [] Flat  [] Bizarre      Thought Process and Association:  [] Logical [] Illogical                                        [x] Linear and Goal Directed  [] Tangential  [] Circumstantial      Thought Content:  [x] Denies [] Endorses [] Suicidal [] Homicidal  [] Delusional                                       [] Paranoid  [] Somatic  [] Grandiose     Perception: [x]  None  [] Auditory   [] Visual  [] tactile   [] olfactory  [] Illusions         Insight: [] Intact  [] Fair  [x] Limited    Judgement:  [] Intact  [] Fair  [x] Limited       Assessment/Plan:        Patient Active Problem List   Diagnosis Code    Drug overdose, intentional self-harm, initial encounter ([facility] [address] T50.902A    Major depression, single episode F32.9         Plan:    [x]  Patient is refusing medications  [x] Improving as expected   [] Not improving as expected   [] Worsening    []  At Baseline     Will continue current treatment    Reason for more than one antipsychotic:  [x] N/A  [] 3 failed monotherapy(drugs tried):  [] Cross over to a new antipsychotic  [] Taper to monotherapy from polypharmacy  [] Augmentation of Clozapine therapy due to treatment resistance to single therapy      Signed:  Severiano Sander  7/21/2019  9:31 AM

## 2019-07-21 NOTE — PROGRESS NOTES
Group Therapy Note    Date: 7/21/2019  Start Time: 11:00 AM  End Time: 11:40 AM  Number of Participants: 14    Type of Group: Cognitive Skills    Wellness Binder Information  Module Name:  n/a  Session Number:  n/a    Patient's Goal: To participate in leisure activity. Notes: Pt was actively engaged in group discussion and activity. Status After Intervention:  Improved    Participation Level:  Active Listener and Interactive    Participation Quality: Appropriate, Attentive and Sharing      Speech:  normal      Thought Process/Content: Logical  Linear      Affective Functioning: Congruent      Mood: euthymic      Level of consciousness:  Alert, Oriented x4 and Attentive      Response to Learning: Able to verbalize current knowledge/experience, Able to verbalize/acknowledge new learning and Able to retain information      Endings: None Reported    Modes of Intervention: Education, Support, Socialization, Exploration, Clarifying, Problem-solving and Activity      Discipline Responsible: /Counselor      Signature:  Amari Burris

## 2019-07-22 PROCEDURE — 6370000000 HC RX 637 (ALT 250 FOR IP): Performed by: PSYCHIATRY & NEUROLOGY

## 2019-07-22 PROCEDURE — 99231 SBSQ HOSP IP/OBS SF/LOW 25: CPT | Performed by: NURSE PRACTITIONER

## 2019-07-22 PROCEDURE — 1240000000 HC EMOTIONAL WELLNESS R&B

## 2019-07-22 ASSESSMENT — PAIN SCALES - GENERAL
PAINLEVEL_OUTOF10: 0
PAINLEVEL_OUTOF10: 0

## 2019-07-22 NOTE — PROGRESS NOTES
Pt.s last dose of invega sustenna was 117 mg. Given on July 3, 19. Pt.s mother Emerita Kessler wants to talk with . Her number is [phone].

## 2019-07-22 NOTE — GROUP NOTE
Group Therapy Note    Date: July 21    Group Start Time: 2000  Group End Time: 2035  Group Topic: Healthy Living/Wellness    [facility]    Hector Tabares RN        Group Therapy Note    Pt attended and participated in wellness and wrap up group.      Attendees: 20 / 24

## 2019-07-22 NOTE — GROUP NOTE
Group Therapy Note    Date: July 22    Group Start Time: 1020  Group End Time: 0938  Group Topic: Psychoeducation    [facility]  [id number] [facility], [facility]        Group Therapy Note    Attendees: [address] [place] [address] Information  Module Name:  id of stressors  Patient's objective: patients will be able to identify daily stressors, and positive coping skills to manage wellness on daily basis. Status After Intervention:  Improved    Participation Level: Active Listener and Interactive    Participation Quality: Appropriate, Attentive, Sharing and Supportive      Speech:  normal       Thought Process/Content: Logical      Affective Functioning: Congruent      Mood: euthymic      Level of consciousness:  Alert, Oriented x4 and Attentive      Response to Learning: Able to verbalize/acknowledge new learning, Able to retain information and Progressing to goal      Endings: None Reported    Modes of Intervention: Education, Support, Socialization and Problem-solving      Discipline Responsible: Psychoeducational Specialist      Signature:  Doris Page [facility]        Notes:  Pleasant and sharing in group offering positive insight to others.

## 2019-07-22 NOTE — GROUP NOTE
Group Therapy Note    Date: July 22    Group Start Time: 0130  Group End Time: 0215  Group Topic: Cognitive Skills    [facility]    JESSICA Farfan, LILLIE        Group Therapy Note    Attendees: 13  Type of group: Cognitive Skills  Mode of intervention: Education, Support and Socialization  Topic:   Objective: To acquire new communications skills to address people who might be creating a barrier to effective interpersonal relationships             Notes: Pt was able to participate in cognitive skills class focusing on acquiring better communication skills to increase interpersonal relationships        Status After Intervention:  Improved    Participation Level:  Active Listener and Interactive    Participation Quality: Appropriate      Speech:  normal      Thought Process/Content: Logical      Affective Functioning: Congruent      Mood: euthymic      Level of consciousness:  Alert, Oriented x4 and Attentive      Response to Learning: Able to verbalize current knowledge/experience      Endings: None Reported    Modes of Intervention: Education      Discipline Responsible: /Counselor      Signature:  JESSICA Farfan [place]

## 2019-07-23 LAB
CHOLESTEROL, TOTAL: 142 MG/DL (ref 0–199)
HBA1C MFR BLD: 4.9 % (ref 4–5.6)
HDLC SERPL-MCNC: 34 MG/DL
LDL CHOLESTEROL CALCULATED: 88 MG/DL (ref 0–99)
TRIGL SERPL-MCNC: 100 MG/DL (ref 0–149)
VLDLC SERPL CALC-MCNC: 20 MG/DL

## 2019-07-23 PROCEDURE — 80061 LIPID PANEL: CPT

## 2019-07-23 PROCEDURE — 1240000000 HC EMOTIONAL WELLNESS R&B

## 2019-07-23 PROCEDURE — 36415 COLL VENOUS BLD VENIPUNCTURE: CPT

## 2019-07-23 PROCEDURE — 83036 HEMOGLOBIN GLYCOSYLATED A1C: CPT

## 2019-07-23 PROCEDURE — 6370000000 HC RX 637 (ALT 250 FOR IP): Performed by: PSYCHIATRY & NEUROLOGY

## 2019-07-23 PROCEDURE — 99232 SBSQ HOSP IP/OBS MODERATE 35: CPT | Performed by: NURSE PRACTITIONER

## 2019-07-23 ASSESSMENT — PAIN SCALES - GENERAL
PAINLEVEL_OUTOF10: 0

## 2019-07-23 NOTE — GROUP NOTE
Group Therapy Note    Date: July 22    Group Start Time: 2010  Group End Time: 2020  Group Topic: Wrap-Up    [facility]    Amadeo Morton RN        Group Therapy Note    Attendees: 17/23  Pt attended and participated in wrap up group.           Signature:  Amadeo Morton RN

## 2019-07-23 NOTE — GROUP NOTE
Patient attended community meeting/morning stretch. Patient identified goal for the day as: \"Call friends and family\"                                                                         Group Therapy Note    Date: July 23    Group Start Time: 1000  Group End Time: 1040  Group Topic: Psychoeducation    [facility]    Joaquina Mendenhall, CTRS        Group Therapy Note    Number of participants: 17  Type of group: Psychoeducation  Mode of intervention: Education, Support, Socialization, Exploration, Clarifying, Problem-solving and Activity  Topic: A Mindful Response to Thoughts: APPLE   Objective: Patient will identify ways to acknowledge, pause, pull back, let go, and explore (APPLE) in recovery. Notes:   Patient was interactive during group sharing ways to utilize APPLE in recovery. Patient gave support and feedback to others identifying what aspects of APPLE she wants to implement upon discharge. Status After Intervention:  Improved    Participation Level:  Active Listener and Interactive    Participation Quality: Appropriate, Attentive, Sharing and Supportive      Speech:  normal      Thought Process/Content: Logical      Affective Functioning: Congruent      Mood: euthymic      Level of consciousness:  Alert, Oriented x4 and Attentive      Response to Learning: Able to verbalize current knowledge/experience, Able to verbalize/acknowledge new learning, Able to retain information, Capable of insight, Able to change behavior and Progressing to goal      Endings: None Reported    Modes of Intervention: Education, Support, Socialization, Exploration, Clarifying, Problem-solving and Activity

## 2019-07-23 NOTE — GROUP NOTE
Group Therapy Note    Date: July 22    Group Start Time: 2000  Group End Time: 2010  Group Topic: Healthy Living/Wellness    [facility]    Mehul Beltran RN        Group Therapy Note    Attendees: 17/23  Pt attended and participated in wellness group.           Signature:  Mehul Beltran RN

## 2019-07-24 VITALS
RESPIRATION RATE: 18 BRPM | SYSTOLIC BLOOD PRESSURE: 108 MMHG | WEIGHT: 188 LBS | OXYGEN SATURATION: 99 % | BODY MASS INDEX: 30.22 KG/M2 | DIASTOLIC BLOOD PRESSURE: 67 MMHG | TEMPERATURE: 97.4 F | HEART RATE: 54 BPM | HEIGHT: 66 IN

## 2019-07-24 PROCEDURE — 6370000000 HC RX 637 (ALT 250 FOR IP): Performed by: PSYCHIATRY & NEUROLOGY

## 2019-07-24 PROCEDURE — 99238 HOSP IP/OBS DSCHRG MGMT 30/<: CPT | Performed by: NURSE PRACTITIONER

## 2019-07-24 RX ORDER — NICOTINE 21 MG/24HR
1 PATCH, TRANSDERMAL 24 HOURS TRANSDERMAL DAILY
Qty: 30 PATCH | Refills: 0 | Status: SHIPPED | OUTPATIENT
Start: 2019-07-24 | End: 2021-09-13 | Stop reason: ALTCHOICE

## 2019-07-24 RX ORDER — CITALOPRAM 40 MG/1
40 TABLET ORAL DAILY
Qty: 30 TABLET | Refills: 0 | Status: SHIPPED | OUTPATIENT
Start: 2019-07-24 | End: 2021-09-13 | Stop reason: ALTCHOICE

## 2019-07-24 ASSESSMENT — PAIN SCALES - GENERAL
PAINLEVEL_OUTOF10: 0
PAINLEVEL_OUTOF10: 0

## 2019-07-24 NOTE — PLAN OF CARE
Problem: Depressive Behavior With or Without Suicide Precautions:  Goal: Able to verbalize acceptance of life and situations over which he or she has no control  Description  Able to verbalize acceptance of life and situations over which he or she has no control  7/24/2019 1115 by Kofi Gregg RN  Outcome: Completed  7/24/2019 0137 by Felix Nieves RN  Outcome: Ongoing  7/23/2019 2315 by Felix Nieves RN  Outcome: Ongoing  Goal: Able to verbalize and/or display a decrease in depressive symptoms  Description  Able to verbalize and/or display a decrease in depressive symptoms  7/24/2019 1115 by Kofi Gregg RN  Outcome: Completed  7/24/2019 0137 by Felix Nieves RN  Outcome: Ongoing  7/23/2019 2315 by Felix Nieves RN  Outcome: Ongoing  Goal: Ability to disclose and discuss suicidal ideas will improve  Description  Ability to disclose and discuss suicidal ideas will improve  7/24/2019 1115 by Kofi Gregg RN  Outcome: Completed  7/24/2019 0137 by Felix Nieves RN  Outcome: Ongoing  7/23/2019 2315 by Felix Nieves RN  Outcome: Met This Shift  Goal: Able to verbalize support systems  Description  Able to verbalize support systems  7/24/2019 1115 by Kofi Gregg RN  Outcome: Completed  7/23/2019 2315 by Felix Nieves RN  Outcome: Ongoing  Goal: Absence of self-harm  Description  Absence of self-harm  7/24/2019 1115 by Kofi Gregg RN  Outcome: Completed  7/24/2019 0137 by Felix Nieves RN  Outcome: Met This Shift  7/23/2019 2315 by Felix Nieves RN  Outcome: Met This Shift

## 2019-07-24 NOTE — DISCHARGE SUMMARY
Restless    Affect:  [x] Congruent  [] Incongruent  [] Labile  [] Constricted  [] Flat  [] Bizarre     Thought Process and Association:  [] Logical [] Illogical       [x] Linear and Goal Directed  [] Tangential  [] Circumstantial     Thought Content:  [x] Denies [] Endorses [] Suicidal [] Homicidal  [] Delusional      [] Paranoid  [] Somatic  [] Grandiose    Perception: [x]  None  [] Auditory   [] Visual  [] tactile   [] olfactory  [] Illusions         Insight: [] Intact  [x] Fair  [] Limited    Judgement:  [] Intact  [x] Fair  [] Limited    Hospital Course:   Admit Date: 7/19/2019     Discharge Date: 7/24/2019  Admitted from:  [x]  Emergency Room  []  Home  []  Another facility   []  [facility]     Admitting diagnosis:   Patient Active Problem List   Diagnosis    [facility]    Major depression, single episode      Length of stay:  5 days              Hayley Yoder was admitted in Psychiatric unit  from ED with depression. Patient was treated with the above. Patient responded well to the treatment. Discharge Summary Plan:     Discharge Status:    [x] Improved [] Unchanged    [] Worse       Discharge instructions given:  [x] Patient    [] Family [] Other         Discharge disposition:  [x] Home [] Step Down unit  [] Group Home []  [facility]                                                    [] [facility]    [] AMA  [] Other           Prescriptions: Continue same medications, review with patient.        Reason for more than one antipsychotic:  [x] N/A  [] 3 failed monotherapy(drugs tried):  [] Cross over to a new antipsychotic  [] Taper to monotherapy from polypharmacy  [] Augmentation of Clozapine therapy due to treatment resistance to single therapy      Diagnosis:        Patient Active Problem List   Diagnosis Code    Drug overdose, intentional self-harm, [facility] T50.902A    Major depression, single episode F32.9       Education and

## 2021-01-20 ENCOUNTER — HOSPITAL ENCOUNTER (OUTPATIENT)
Dept: GENERAL RADIOLOGY | Age: 26
Discharge: HOME | End: 2021-01-22
Payer: MEDICAID

## 2021-01-20 ENCOUNTER — HOSPITAL ENCOUNTER (OUTPATIENT)
Age: 26
Discharge: HOME | End: 2021-01-22
Payer: MEDICAID

## 2021-01-20 DIAGNOSIS — R05.9 COUGH: ICD-10-CM

## 2021-01-20 PROCEDURE — 71046 X-RAY EXAM CHEST 2 VIEWS: CPT

## 2021-08-18 ENCOUNTER — HOSPITAL ENCOUNTER (OUTPATIENT)
Dept: ULTRASOUND IMAGING | Age: 26
Discharge: HOME | End: 2021-08-20
Payer: MEDICAID

## 2021-08-18 ENCOUNTER — HOSPITAL ENCOUNTER (OUTPATIENT)
Age: 26
Discharge: HOME | End: 2021-08-20
Payer: MEDICAID

## 2021-08-18 DIAGNOSIS — N39.42 INCONTINENCE WITHOUT SENSORY AWARENESS: ICD-10-CM

## 2021-08-18 DIAGNOSIS — N50.812 LEFT TESTICULAR PAIN: ICD-10-CM

## 2021-08-18 PROCEDURE — 76870 US EXAM SCROTUM: CPT

## 2021-08-18 PROCEDURE — 76775 US EXAM ABDO BACK WALL LIM: CPT

## 2021-09-13 ENCOUNTER — OFFICE VISIT (OUTPATIENT)
Dept: SURGERY | Age: 26
End: 2021-09-13
Payer: COMMERCIAL

## 2021-09-13 VITALS
HEART RATE: 56 BPM | BODY MASS INDEX: 30.01 KG/M2 | DIASTOLIC BLOOD PRESSURE: 67 MMHG | TEMPERATURE: 96.8 F | SYSTOLIC BLOOD PRESSURE: 97 MMHG | HEIGHT: 68 IN | WEIGHT: 198 LBS | RESPIRATION RATE: 16 BRPM

## 2021-09-13 DIAGNOSIS — L05.91 PILONIDAL CYST: Primary | ICD-10-CM

## 2021-09-13 PROCEDURE — 99203 OFFICE O/P NEW LOW 30 MIN: CPT | Performed by: SURGERY

## 2021-09-13 PROCEDURE — G8427 DOCREV CUR MEDS BY ELIG CLIN: HCPCS | Performed by: SURGERY

## 2021-09-13 PROCEDURE — G8417 CALC BMI ABV UP PARAM F/U: HCPCS | Performed by: SURGERY

## 2021-09-13 PROCEDURE — 4004F PT TOBACCO SCREEN RCVD TLK: CPT | Performed by: SURGERY

## 2021-09-13 RX ORDER — ESCITALOPRAM OXALATE 10 MG/1
TABLET ORAL
COMMUNITY
Start: 2021-09-04

## 2021-09-13 NOTE — PROGRESS NOTES
(Temporal)   Resp 16   Ht 5' 7.5\" (1.715 m)   Wt 198 lb (89.8 kg)   BMI 30.55 kg/m²     General appearance: alert, cooperative and in no acute distress. Eyes: Grossly normal   Lungs: Normal work of breathing  Heart: regular rate  Abdomen: soft, non-tender, non distended  Skin: No skin abnormalities  Neurologic: Alert and oriented x 3. Grossly normal  Musculoskeletal: No edema. Coccyx: multiple small midline pores, no drainage, no sign of infection. abscess    ASSESSMENT AND PLAN:     Anh Ambrocio is an 23 y.o. male who presents with a pilonidal cyst    Discussed with the patient the options including surgical and conservative. I do not think there is any urgency to surgical management at this time given he hasn't required I and D or antibiotics recently. Discussed hygiene to prevent recurrence and instructed to follow up if he decides on surgical excision.           Physician Signature: Electronically signed by Kim Felton MD, General Surgery    Send copy of H&P to PCP, Del Schwab DO and referring physician, Sarai Cool DO

## 2022-06-27 ENCOUNTER — APPOINTMENT (OUTPATIENT)
Dept: CT IMAGING | Age: 27
End: 2022-06-27
Payer: COMMERCIAL

## 2022-06-27 ENCOUNTER — APPOINTMENT (OUTPATIENT)
Dept: GENERAL RADIOLOGY | Age: 27
End: 2022-06-27
Payer: COMMERCIAL

## 2022-06-27 ENCOUNTER — HOSPITAL ENCOUNTER (EMERGENCY)
Age: 27
Discharge: HOME | End: 2022-06-27
Payer: COMMERCIAL

## 2022-06-27 VITALS
RESPIRATION RATE: 16 BRPM | HEART RATE: 74 BPM | TEMPERATURE: 98 F | WEIGHT: 200 LBS | HEIGHT: 68 IN | SYSTOLIC BLOOD PRESSURE: 133 MMHG | DIASTOLIC BLOOD PRESSURE: 96 MMHG | BODY MASS INDEX: 30.31 KG/M2 | OXYGEN SATURATION: 99 %

## 2022-06-27 DIAGNOSIS — S16.1XXA STRAIN OF NECK MUSCLE, INITIAL ENCOUNTER: ICD-10-CM

## 2022-06-27 DIAGNOSIS — S09.90XA CLOSED HEAD INJURY, INITIAL ENCOUNTER: ICD-10-CM
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
## 2022-06-27 DIAGNOSIS — S89.91XA INJURY OF RIGHT LOWER EXTREMITY, INITIAL ENCOUNTER: ICD-10-CM

## 2022-06-27 PROCEDURE — 70450 CT HEAD/BRAIN W/O DYE: CPT

## 2022-06-27 PROCEDURE — 99284 EMERGENCY DEPT VISIT MOD MDM: CPT

## 2022-06-27 PROCEDURE — 6370000000 HC RX 637 (ALT 250 FOR IP): Performed by: NURSE PRACTITIONER

## 2022-06-27 PROCEDURE — 73590 X-RAY EXAM OF LOWER LEG: CPT

## 2022-06-27 PROCEDURE — 72125 CT NECK SPINE W/O DYE: CPT

## 2022-06-27 RX ORDER — CYCLOBENZAPRINE HCL 10 MG
5 TABLET ORAL ONCE
Status: COMPLETED | OUTPATIENT
Start: 2022-06-27 | End: 2022-06-27

## 2022-06-27 RX ORDER — ACETAMINOPHEN 325 MG/1
650 TABLET ORAL ONCE
Status: COMPLETED | OUTPATIENT
Start: 2022-06-27 | End: 2022-06-27

## 2022-06-27 RX ORDER — NAPROXEN 500 MG/1
500 TABLET ORAL 2 TIMES DAILY WITH MEALS
Qty: 10 TABLET | Refills: 0 | Status: SHIPPED | OUTPATIENT
Start: 2022-06-27 | End: 2022-07-02

## 2022-06-27 RX ADMIN — ACETAMINOPHEN 650 MG: 325 TABLET ORAL at 18:09

## 2022-06-27 RX ADMIN — CYCLOBENZAPRINE 5 MG: 10 TABLET, FILM COATED ORAL at 18:09

## 2022-06-27 ASSESSMENT — PAIN DESCRIPTION - LOCATION
LOCATION: NECK
LOCATION: NECK

## 2022-06-27 ASSESSMENT — PAIN DESCRIPTION - ORIENTATION: ORIENTATION: RIGHT

## 2022-06-27 ASSESSMENT — PAIN SCALES - GENERAL
PAINLEVEL_OUTOF10: 8
PAINLEVEL_OUTOF10: 6

## 2022-06-27 ASSESSMENT — PAIN DESCRIPTION - DESCRIPTORS: DESCRIPTORS: ACHING

## 2022-06-27 ASSESSMENT — PAIN - FUNCTIONAL ASSESSMENT: PAIN_FUNCTIONAL_ASSESSMENT: 0-10

## 2022-06-27 ASSESSMENT — PAIN DESCRIPTION - PAIN TYPE: TYPE: ACUTE PAIN
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
## 2024-04-10 ENCOUNTER — HOSPITAL ENCOUNTER (INPATIENT)
Age: 29
LOS: 5 days | Discharge: HOME | DRG: 881 | End: 2024-04-15
Attending: EMERGENCY MEDICINE | Admitting: PSYCHIATRY & NEUROLOGY
Payer: COMMERCIAL

## 2024-04-10 DIAGNOSIS — F39 MOOD DISORDER (HCC): Primary | ICD-10-CM

## 2024-04-10 PROBLEM — F29 PSYCHOTIC DISORDER WITH HALLUCINATIONS (HCC): Status: ACTIVE | Noted: 2024-04-10

## 2024-04-10 LAB
ALBUMIN SERPL-MCNC: 5.1 G/DL (ref 3.5–5.2)
ALP SERPL-CCNC: 79 U/L (ref 40–129)
ALT SERPL-CCNC: 21 U/L (ref 0–40)
AMPHET UR QL SCN: NEGATIVE
ANION GAP SERPL CALCULATED.3IONS-SCNC: 16 MMOL/L (ref 7–16)
APAP SERPL-MCNC: <5 UG/ML (ref 10–30)
AST SERPL-CCNC: 33 U/L (ref 0–39)
BARBITURATES UR QL SCN: NEGATIVE
BASOPHILS # BLD: 0.08 K/UL (ref 0–0.2)
BASOPHILS NFR BLD: 1 % (ref 0–2)
BENZODIAZ UR QL: NEGATIVE
BILIRUB SERPL-MCNC: 1.6 MG/DL (ref 0–1.2)
BUN SERPL-MCNC: 12 MG/DL (ref 6–20)
BUPRENORPHINE UR QL: NEGATIVE
CALCIUM SERPL-MCNC: 9.8 MG/DL (ref 8.6–10.2)
CANNABINOIDS UR QL SCN: POSITIVE
CHLORIDE SERPL-SCNC: 103 MMOL/L (ref 98–107)
CO2 SERPL-SCNC: 25 MMOL/L (ref 22–29)
COCAINE UR QL SCN: NEGATIVE
CREAT SERPL-MCNC: 1.1 MG/DL (ref 0.7–1.2)
EOSINOPHIL # BLD: 0.02 K/UL (ref 0.05–0.5)
EOSINOPHILS RELATIVE PERCENT: 0 % (ref 0–6)
ERYTHROCYTE [DISTWIDTH] IN BLOOD BY AUTOMATED COUNT: 13 % (ref 11.5–15)
ETHANOLAMINE SERPL-MCNC: <10 MG/DL
FENTANYL UR QL: NEGATIVE
GFR SERPL CREATININE-BSD FRML MDRD: >90 ML/MIN/1.73M2
GLUCOSE SERPL-MCNC: 86 MG/DL (ref 74–99)
HCT VFR BLD AUTO: 47.1 % (ref 37–54)
HGB BLD-MCNC: 16 G/DL (ref 12.5–16.5)
IMM GRANULOCYTES # BLD AUTO: <0.03 K/UL (ref 0–0.58)
IMM GRANULOCYTES NFR BLD: 0 % (ref 0–5)
LYMPHOCYTES NFR BLD: 1.64 K/UL (ref 1.5–4)
LYMPHOCYTES RELATIVE PERCENT: 17 % (ref 20–42)
MCH RBC QN AUTO: 31.4 PG (ref 26–35)
MCHC RBC AUTO-ENTMCNC: 34 G/DL (ref 32–34.5)
MCV RBC AUTO: 92.4 FL (ref 80–99.9)
METHADONE UR QL: NEGATIVE
MONOCYTES NFR BLD: 0.59 K/UL (ref 0.1–0.95)
MONOCYTES NFR BLD: 6 % (ref 2–12)
NEUTROPHILS NFR BLD: 75 % (ref 43–80)
NEUTS SEG NFR BLD: 7.08 K/UL (ref 1.8–7.3)
OPIATES UR QL SCN: NEGATIVE
OXYCODONE UR QL SCN: NEGATIVE
PCP UR QL SCN: NEGATIVE
PLATELET # BLD AUTO: 264 K/UL (ref 130–450)
PMV BLD AUTO: 10.2 FL (ref 7–12)
POTASSIUM SERPL-SCNC: 3.9 MMOL/L (ref 3.5–5)
PROT SERPL-MCNC: 8 G/DL (ref 6.4–8.3)
RBC # BLD AUTO: 5.1 M/UL (ref 3.8–5.8)
SALICYLATES SERPL-MCNC: <0.3 MG/DL (ref 0–30)
SODIUM SERPL-SCNC: 144 MMOL/L (ref 132–146)
TEST INFORMATION: ABNORMAL
TOXIC TRICYCLIC SC,BLOOD: NEGATIVE
WBC OTHER # BLD: 9.4 K/UL (ref 4.5–11.5)

## 2024-04-10 PROCEDURE — 80143 DRUG ASSAY ACETAMINOPHEN: CPT

## 2024-04-10 PROCEDURE — 80179 DRUG ASSAY SALICYLATE: CPT

## 2024-04-10 PROCEDURE — 80307 DRUG TEST PRSMV CHEM ANLYZR: CPT

## 2024-04-10 PROCEDURE — 1240000000 HC EMOTIONAL WELLNESS R&B

## 2024-04-10 PROCEDURE — 80053 COMPREHEN METABOLIC PANEL: CPT

## 2024-04-10 PROCEDURE — 99285 EMERGENCY DEPT VISIT HI MDM: CPT

## 2024-04-10 PROCEDURE — 93005 ELECTROCARDIOGRAM TRACING: CPT | Performed by: EMERGENCY MEDICINE

## 2024-04-10 PROCEDURE — 85025 COMPLETE CBC W/AUTO DIFF WBC: CPT

## 2024-04-10 PROCEDURE — 6370000000 HC RX 637 (ALT 250 FOR IP): Performed by: EMERGENCY MEDICINE

## 2024-04-10 PROCEDURE — 6370000000 HC RX 637 (ALT 250 FOR IP): Performed by: PSYCHIATRY & NEUROLOGY

## 2024-04-10 PROCEDURE — G0480 DRUG TEST DEF 1-7 CLASSES: HCPCS

## 2024-04-10 RX ORDER — HYDROXYZINE PAMOATE 50 MG/1
50 CAPSULE ORAL 3 TIMES DAILY PRN
Status: DISCONTINUED | OUTPATIENT
Start: 2024-04-10 | End: 2024-04-15 | Stop reason: HOSPADM

## 2024-04-10 RX ORDER — LANOLIN ALCOHOL/MO/W.PET/CERES
3 CREAM (GRAM) TOPICAL NIGHTLY PRN
Status: DISCONTINUED | OUTPATIENT
Start: 2024-04-10 | End: 2024-04-15 | Stop reason: HOSPADM

## 2024-04-10 RX ORDER — NICOTINE 21 MG/24HR
1 PATCH, TRANSDERMAL 24 HOURS TRANSDERMAL DAILY
Status: DISCONTINUED | OUTPATIENT
Start: 2024-04-10 | End: 2024-04-10

## 2024-04-10 RX ORDER — ACETAMINOPHEN 325 MG/1
650 TABLET ORAL EVERY 6 HOURS PRN
Status: DISCONTINUED | OUTPATIENT
Start: 2024-04-10 | End: 2024-04-15 | Stop reason: HOSPADM

## 2024-04-10 RX ORDER — MAGNESIUM HYDROXIDE/ALUMINUM HYDROXICE/SIMETHICONE 120; 1200; 1200 MG/30ML; MG/30ML; MG/30ML
30 SUSPENSION ORAL PRN
Status: DISCONTINUED | OUTPATIENT
Start: 2024-04-10 | End: 2024-04-15 | Stop reason: HOSPADM

## 2024-04-10 RX ORDER — HALOPERIDOL 5 MG/ML
5 INJECTION INTRAMUSCULAR EVERY 6 HOURS PRN
Status: DISCONTINUED | OUTPATIENT
Start: 2024-04-10 | End: 2024-04-15 | Stop reason: HOSPADM

## 2024-04-10 RX ORDER — LORAZEPAM 1 MG/1
1 TABLET ORAL ONCE
Status: COMPLETED | OUTPATIENT
Start: 2024-04-10 | End: 2024-04-10

## 2024-04-10 RX ORDER — POLYETHYLENE GLYCOL 3350 17 G
2 POWDER IN PACKET (EA) ORAL
Status: DISCONTINUED | OUTPATIENT
Start: 2024-04-10 | End: 2024-04-15 | Stop reason: HOSPADM

## 2024-04-10 RX ORDER — HALOPERIDOL 5 MG/1
5 TABLET ORAL EVERY 6 HOURS PRN
Status: DISCONTINUED | OUTPATIENT
Start: 2024-04-10 | End: 2024-04-15 | Stop reason: HOSPADM

## 2024-04-10 RX ADMIN — HYDROXYZINE PAMOATE 50 MG: 50 CAPSULE ORAL at 23:01

## 2024-04-10 RX ADMIN — Medication 3 MG: at 23:01

## 2024-04-10 RX ADMIN — LORAZEPAM 1 MG: 1 TABLET ORAL at 15:34

## 2024-04-10 ASSESSMENT — PAIN - FUNCTIONAL ASSESSMENT
PAIN_FUNCTIONAL_ASSESSMENT: NONE - DENIES PAIN
PAIN_FUNCTIONAL_ASSESSMENT: NONE - DENIES PAIN

## 2024-04-10 ASSESSMENT — SLEEP AND FATIGUE QUESTIONNAIRES
AVERAGE NUMBER OF SLEEP HOURS: 2
DO YOU HAVE DIFFICULTY SLEEPING: YES
SLEEP PATTERN: DIFFICULTY FALLING ASLEEP;DISTURBED/INTERRUPTED SLEEP
DO YOU USE A SLEEP AID: NO

## 2024-04-10 ASSESSMENT — LIFESTYLE VARIABLES
HOW MANY STANDARD DRINKS CONTAINING ALCOHOL DO YOU HAVE ON A TYPICAL DAY: 1 OR 2
HOW MANY STANDARD DRINKS CONTAINING ALCOHOL DO YOU HAVE ON A TYPICAL DAY: 1 OR 2
HOW OFTEN DO YOU HAVE A DRINK CONTAINING ALCOHOL: MONTHLY OR LESS
HOW OFTEN DO YOU HAVE A DRINK CONTAINING ALCOHOL: MONTHLY OR LESS
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
## 2024-04-11 LAB
CHOLEST SERPL-MCNC: 137 MG/DL
EKG ATRIAL RATE: 66 BPM
EKG P AXIS: 22 DEGREES
EKG P-R INTERVAL: 136 MS
EKG Q-T INTERVAL: 398 MS
EKG QRS DURATION: 82 MS
EKG QTC CALCULATION (BAZETT): 417 MS
EKG R AXIS: 5 DEGREES
EKG T AXIS: 29 DEGREES
EKG VENTRICULAR RATE: 66 BPM
HBA1C MFR BLD: 5 % (ref 4–5.6)
HDLC SERPL-MCNC: 37 MG/DL
LDLC SERPL CALC-MCNC: 80 MG/DL
TRIGL SERPL-MCNC: 98 MG/DL
VLDLC SERPL CALC-MCNC: 20 MG/DL

## 2024-04-11 PROCEDURE — 90792 PSYCH DIAG EVAL W/MED SRVCS: CPT | Performed by: NURSE PRACTITIONER

## 2024-04-11 PROCEDURE — 6370000000 HC RX 637 (ALT 250 FOR IP): Performed by: PSYCHIATRY & NEUROLOGY

## 2024-04-11 PROCEDURE — 83036 HEMOGLOBIN GLYCOSYLATED A1C: CPT

## 2024-04-11 PROCEDURE — 6370000000 HC RX 637 (ALT 250 FOR IP): Performed by: NURSE PRACTITIONER

## 2024-04-11 PROCEDURE — 80061 LIPID PANEL: CPT

## 2024-04-11 PROCEDURE — 1240000000 HC EMOTIONAL WELLNESS R&B

## 2024-04-11 PROCEDURE — 93010 ELECTROCARDIOGRAM REPORT: CPT | Performed by: INTERNAL MEDICINE

## 2024-04-11 PROCEDURE — 36415 COLL VENOUS BLD VENIPUNCTURE: CPT

## 2024-04-11 RX ORDER — OXCARBAZEPINE 150 MG/1
150 TABLET, FILM COATED ORAL 2 TIMES DAILY
Status: DISCONTINUED | OUTPATIENT
Start: 2024-04-11 | End: 2024-04-12

## 2024-04-11 RX ADMIN — HALOPERIDOL 5 MG: 5 TABLET ORAL at 12:53

## 2024-04-11 RX ADMIN — Medication 3 MG: at 21:35

## 2024-04-11 RX ADMIN — HYDROXYZINE PAMOATE 50 MG: 50 CAPSULE ORAL at 21:35

## 2024-04-11 RX ADMIN — HYDROXYZINE PAMOATE 50 MG: 50 CAPSULE ORAL at 12:53

## 2024-04-11 RX ADMIN — OXCARBAZEPINE 150 MG: 150 TABLET, FILM COATED ORAL at 21:35

## 2024-04-11 ASSESSMENT — LIFESTYLE VARIABLES: HOW OFTEN DO YOU HAVE A DRINK CONTAINING ALCOHOL: MONTHLY OR LESS

## 2024-04-11 ASSESSMENT — PAIN SCALES - GENERAL: PAINLEVEL_OUTOF10: 0

## 2024-04-11 ASSESSMENT — PATIENT HEALTH QUESTIONNAIRE - PHQ9
2. FEELING DOWN, DEPRESSED OR HOPELESS: NOT AT ALL
SUM OF ALL RESPONSES TO PHQ QUESTIONS 1-9: 0
SUM OF ALL RESPONSES TO PHQ9 QUESTIONS 1 & 2: 0
1. LITTLE INTEREST OR PLEASURE IN DOING THINGS: NOT AT ALL

## 2024-04-11 ASSESSMENT — SLEEP AND FATIGUE QUESTIONNAIRES
DO YOU HAVE DIFFICULTY SLEEPING: YES
DO YOU USE A SLEEP AID: NO
SLEEP PATTERN: DIFFICULTY FALLING ASLEEP;DISTURBED/INTERRUPTED SLEEP;NIGHTMARES/TERRORS

## 2024-04-11 NOTE — ED NOTES
Attempted to call report.   
Family updated on pt transport  
Patient dressed in hospital attire.  Personal belongings stowed in locker #28  
Patient is medically clear for psych eval.     Ac-Nathalia Whelan MD  04/10/24 6086    
Patient presented for admission to Dr. Gamboa. Patient accepted for admission to .    
Per mom pt had rx to wellbutrin   
Pt accepted to Dr Gamboa's service per [facility] nurse Jeanette Greer in admitting aware of assigned bed [address], call to [address], no answer, SW will call back to advise of pending admission.   
Pt in hallway with mother. Pt in bed sleeping  
Pt in hallway. Pt in bed sleeping.  
 No  3. Have you ever been arrested due to violence? []  Yes [x]  No  4. Have you ever been cruel to animals? []  Yes [x]  No    After consideration of C-SSRS screening results, C-SSRS assessments, and this professional's assessment the patient's overall suicide risk assessed to be:  [] No Risk  [] Low   [x] Moderate   [] High     [x] Discussed current suicide risk, protective and risk factors with RN and ED Physician.    Consulted with ED Physician. Disposition/level of care recommended at this time:  [] Home:   [] Outpatient Provider:   [] Crisis Unit:   [x] Inpatient Psychiatric Unit:  Review for admission  [] Other:

## 2024-04-11 NOTE — H&P
friends.  Will obtain medical records as appropriate from out patient providers  Will consult the hospitalist for a physical exam to rule out any co-morbid physical condition.    Home medication Reconciled       New Medications started during this admission :        Prn Haldol 5mg and Vistaril 50mg q6hr for extreme agitation.  Trazodone as ordered for insomnia  Vistaril as ordered for anxiety      Psychotherapy:   Encourage participation in milieu and group therapy  Individual therapy as needed        Patient's diagnosis, treatment plan, medication management was formulated at the end of evaluation and after reviewing relevant documentation. Patient was seen directly by myself and Dr. Cooper Pack Trileptal 150 mg twice daily  Patient is on Invega Trinza 819 every 3 months last received on 2/12/2024    Can discontinue constant observer.  Patient is deemed to be moderate risk for suicide based on productive risk factors as well as risk mitigation          Behavioral Services  Medicare Certification Upon Admission    I certify that this patient's inpatient psychiatric hospital admission is medically necessary for:    [x] (1) Treatment which could reasonably be expected to improve this patient's condition,       [ ] (2) Or for diagnostic study;     AND     [x](2) The inpatient psychiatric services are provided while the individual is under the care of a physician and are included in the individualized plan of care.    Estimated length of stay/service 3 to 7 days based on stability    Plan for post-hospital care outpatient psychiatric and counseling services    Electronically signed by YUN Arnold CNP on 4/11/2024 at 5:54 AM

## 2024-04-11 NOTE — GROUP NOTE
Group Therapy Note    Date: 4/11/2024    Group Start Time: 1050  Group End Time: 1130  Group Topic: Cognitive Skills    [facility]    Seven Alejo MSW        Group Therapy Note    Attendees: 14       Patient's Goal: Pt will actively participate in group discussion on getting out of thinking traps and cognitive distortions.    Notes: Pt was an active listener in group discussion.    Status After Intervention:  Unchanged    Participation Level: Active Listener    Participation Quality: Appropriate and Attentive      Speech:  mute      Thought Process/Content: Linear      Affective Functioning: Congruent      Mood: depressed      Level of consciousness:  Alert, Oriented x4, and Attentive      Response to Learning: Able to verbalize current knowledge/experience, Able to verbalize/acknowledge new learning, and Able to retain information      Endings: None Reported    Modes of Intervention: Education, Support, Socialization, Exploration, Clarifying, and Problem-solving      Discipline Responsible: /Counselor      Signature:  JESSICA Nino

## 2024-04-11 NOTE — DISCHARGE INSTRUCTIONS
Patient was offered a referral to substance abuse treatment, patient declined referral at this time.      Follow up for Tobacco Cessation at:    [facility]                                 Date:  Friday 4/19 at 10am              [address] [address]   (Inside [facility]    take B elevators to 7th floor)   Phone: [phone]   Fax: [phone]

## 2024-04-12 PROCEDURE — 6370000000 HC RX 637 (ALT 250 FOR IP): Performed by: PSYCHIATRY & NEUROLOGY

## 2024-04-12 PROCEDURE — 6370000000 HC RX 637 (ALT 250 FOR IP): Performed by: NURSE PRACTITIONER

## 2024-04-12 PROCEDURE — 99232 SBSQ HOSP IP/OBS MODERATE 35: CPT | Performed by: NURSE PRACTITIONER

## 2024-04-12 PROCEDURE — 1240000000 HC EMOTIONAL WELLNESS R&B

## 2024-04-12 RX ORDER — OXCARBAZEPINE 300 MG/1
300 TABLET, FILM COATED ORAL 2 TIMES DAILY
Status: DISCONTINUED | OUTPATIENT
Start: 2024-04-12 | End: 2024-04-15 | Stop reason: HOSPADM

## 2024-04-12 RX ADMIN — Medication 3 MG: at 21:07

## 2024-04-12 RX ADMIN — OXCARBAZEPINE 300 MG: 300 TABLET, FILM COATED ORAL at 21:07

## 2024-04-12 RX ADMIN — OXCARBAZEPINE 150 MG: 150 TABLET, FILM COATED ORAL at 09:20

## 2024-04-12 ASSESSMENT — PAIN SCALES - GENERAL
PAINLEVEL_OUTOF10: 0
PAINLEVEL_OUTOF10: 0

## 2024-04-12 NOTE — GROUP NOTE
Group Therapy Note    Date: 4/12/2024    Group Start Time: 1100  Group End Time: 1140  Group Topic: Cognitive Skills    [facility]    Vibha Montoya, GURU LOPEZ        Group Therapy Note    Attendees: 15       Patient's Goal:  Pt attended group therapy where we learned about \"The Cognitive Model\" and how our thoughts influence our emotions and our emotions dictate our behavior.     Notes:  Pt was an active participant in group therapy.     Status After Intervention:  Improved    Participation Level: Active Listener and Interactive    Participation Quality: Appropriate, Attentive, and Sharing      Speech:  normal      Thought Process/Content: Logical      Affective Functioning: Congruent      Mood: euthymic      Level of consciousness:  Alert and Attentive      Response to Learning: Able to verbalize current knowledge/experience and Able to retain information      Endings: None Reported    Modes of Intervention: Education, Support, Socialization, Exploration, Clarifying, and Problem-solving      Discipline Responsible: /Counselor      Signature:  JESSICA Moses, GURU

## 2024-04-13 PROCEDURE — 6370000000 HC RX 637 (ALT 250 FOR IP): Performed by: NURSE PRACTITIONER

## 2024-04-13 PROCEDURE — 1240000000 HC EMOTIONAL WELLNESS R&B

## 2024-04-13 PROCEDURE — 99232 SBSQ HOSP IP/OBS MODERATE 35: CPT | Performed by: NURSE PRACTITIONER

## 2024-04-13 RX ADMIN — OXCARBAZEPINE 300 MG: 300 TABLET, FILM COATED ORAL at 09:23

## 2024-04-13 RX ADMIN — OXCARBAZEPINE 300 MG: 300 TABLET, FILM COATED ORAL at 20:58

## 2024-04-13 ASSESSMENT — PAIN SCALES - GENERAL: PAINLEVEL_OUTOF10: 0

## 2024-04-14 PROCEDURE — 6370000000 HC RX 637 (ALT 250 FOR IP): Performed by: NURSE PRACTITIONER

## 2024-04-14 PROCEDURE — 99232 SBSQ HOSP IP/OBS MODERATE 35: CPT | Performed by: NURSE PRACTITIONER

## 2024-04-14 PROCEDURE — 6370000000 HC RX 637 (ALT 250 FOR IP): Performed by: PSYCHIATRY & NEUROLOGY

## 2024-04-14 PROCEDURE — 1240000000 HC EMOTIONAL WELLNESS R&B

## 2024-04-14 RX ADMIN — OXCARBAZEPINE 300 MG: 300 TABLET, FILM COATED ORAL at 21:08

## 2024-04-14 RX ADMIN — OXCARBAZEPINE 300 MG: 300 TABLET, FILM COATED ORAL at 09:28

## 2024-04-14 RX ADMIN — Medication 3 MG: at 21:08

## 2024-04-14 ASSESSMENT — PAIN SCALES - GENERAL: PAINLEVEL_OUTOF10: 0

## 2024-04-14 NOTE — GROUP NOTE
Group Therapy Note    Date: 4/14/2024    Group Start Time: 1045  Group End Time: 1125  Group Topic: Cognitive Skills    [facility]    Hattie Boggs MSW, GURU        Group Therapy Note    Attendees: 9       Patient's Goal:  Crisis intervention and Discharge planning     Notes:  pt was attentive in group, able to participate in safety planning    Status After Intervention:  Improved    Participation Level: Active Listener    Participation Quality: Appropriate and Attentive      Speech:  normal      Thought Process/Content: Logical      Affective Functioning: Congruent      Mood: euthymic      Level of consciousness:  Alert, Oriented x4, and Attentive      Response to Learning: Able to verbalize current knowledge/experience and Able to retain information      Endings: None Reported    Modes of Intervention: Education, Support, Socialization, Exploration, Clarifying, Problem-solving, and Activity      Discipline Responsible: /Counselor      Signature:  JESSICA Bhatti LSW

## 2024-04-15 VITALS
RESPIRATION RATE: 15 BRPM | HEART RATE: 65 BPM | DIASTOLIC BLOOD PRESSURE: 67 MMHG | SYSTOLIC BLOOD PRESSURE: 114 MMHG | BODY MASS INDEX: 27.4 KG/M2 | WEIGHT: 185 LBS | HEIGHT: 69 IN | OXYGEN SATURATION: 98 % | TEMPERATURE: 97.7 F

## 2024-04-15 PROCEDURE — 99239 HOSP IP/OBS DSCHRG MGMT >30: CPT | Performed by: NURSE PRACTITIONER

## 2024-04-15 PROCEDURE — 6370000000 HC RX 637 (ALT 250 FOR IP): Performed by: NURSE PRACTITIONER

## 2024-04-15 RX ORDER — PALIPERIDONE PALMITATE 819 MG/2.625ML
819 INJECTION, SUSPENSION, EXTENDED RELEASE INTRAMUSCULAR
COMMUNITY

## 2024-04-15 RX ORDER — OXCARBAZEPINE 300 MG/1
300 TABLET, FILM COATED ORAL 2 TIMES DAILY
Qty: 60 TABLET | Refills: 0 | Status: SHIPPED | OUTPATIENT
Start: 2024-04-15 | End: 2024-05-15

## 2024-04-15 RX ADMIN — OXCARBAZEPINE 300 MG: 300 TABLET, FILM COATED ORAL at 10:15

## 2024-04-15 ASSESSMENT — PAIN SCALES - GENERAL: PAINLEVEL_OUTOF10: 0

## 2024-04-15 NOTE — PLAN OF CARE
Problem: Anxiety  Goal: Will report anxiety at manageable levels  Description: INTERVENTIONS:  1. Administer medication as ordered  2. Teach and rehearse alternative coping skills  3. Provide emotional support with 1:1 interaction with staff  4/13/2024 1636 by Gaby León RN  Outcome: Progressing  4/13/2024 0446 by Lynn Ortega RN  Outcome: Progressing  4/13/2024 0442 by Lynn Ortega RN  Outcome: Progressing     Problem: Depression/Self Harm  Goal: Effect of psychiatric condition will be minimized and patient will be protected from self harm  Description: INTERVENTIONS:  1. Assess impact of patient's symptoms on level of functioning, self care needs and offer support as indicated  2. Assess patient/family knowledge of depression, impact on illness and need for teaching  3. Provide emotional support, presence and reassurance  4. Assess for possible suicidal thoughts or ideation. If patient expresses suicidal thoughts or statements do not leave alone, initiate Suicide Precautions, move to a room close to the nursing station and obtain sitter  5. Initiate consults as appropriate with Mental Health Professional, Spiritual Care, Psychosocial CNS, and consider a recommendation to the [facility] for a Psychiatric Consultation  4/13/2024 1636 by Gaby León RN  Outcome: Progressing  4/13/2024 0446 by Lynn Ortega RN  Outcome: Progressing  4/13/2024 0442 by Lynn Ortega RN  Outcome: Progressing         Pleasant and cooperative. Brighter. Reports feeling better. Denies anxiety and depression. Denies suicidal and homicidal thoughts denies hallucinations.   
  Problem: Anxiety  Goal: Will report anxiety at manageable levels  Description: INTERVENTIONS:  1. Administer medication as ordered  2. Teach and rehearse alternative coping skills  3. Provide emotional support with 1:1 interaction with staff  4/14/2024 1997 by Bert Allen RN  Outcome: Progressing     Problem: Behavior  Goal: Pt/Family maintain appropriate behavior and adhere to behavioral management agreement, if implemented  Description: INTERVENTIONS:  1. Assess patient/family's coping skills and  non-compliant behavior (including use of illegal substances)  2. Notify security of behavior or suspected illegal substances which indicate the need for search of the family and/or belongings  3. Encourage verbalization of thoughts and concerns in a socially appropriate manner  4. Utilize positive, consistent limit setting strategies supporting safety of patient, staff and others  5. Encourage participation in the decision making process about the behavioral management agreement  6. If a visitor's behavior poses a threat to safety call refer to organization policy.  7. Initiate consult with , Psychosocial CNS, Spiritual Care as appropriate  Outcome: Progressing     Problem: Depression/Self Harm  Goal: Effect of psychiatric condition will be minimized and patient will be protected from self harm  Description: INTERVENTIONS:  1. Assess impact of patient's symptoms on level of functioning, self care needs and offer support as indicated  2. Assess patient/family knowledge of depression, impact on illness and need for teaching  3. Provide emotional support, presence and reassurance  4. Assess for possible suicidal thoughts or ideation. If patient expresses suicidal thoughts or statements do not leave alone, initiate Suicide Precautions, move to a room close to the nursing station and obtain sitter  5. Initiate consults as appropriate with Mental Health Professional, Spiritual Care, Psychosocial CNS, and 
  Problem: Anxiety  Goal: Will report anxiety at manageable levels  Description: INTERVENTIONS:  1. Administer medication as ordered  2. Teach and rehearse alternative coping skills  3. Provide emotional support with 1:1 interaction with staff  Outcome: Progressing     Problem: Depression/Self Harm  Goal: Effect of psychiatric condition will be minimized and patient will be protected from self harm  Description: INTERVENTIONS:  1. Assess impact of patient's symptoms on level of functioning, self care needs and offer support as indicated  2. Assess patient/family knowledge of depression, impact on illness and need for teaching  3. Provide emotional support, presence and reassurance  4. Assess for possible suicidal thoughts or ideation. If patient expresses suicidal thoughts or statements do not leave alone, initiate Suicide Precautions, move to a room close to the nursing station and obtain sitter  5. Initiate consults as appropriate with Mental Health Professional, Spiritual Care, Psychosocial CNS, and consider a recommendation to the [facility]IP for a Psychiatric Consultation  Outcome: Progressing     Pleasant and cooperative. Brighter. No unit problems. Denies suicidal and homicidal thoughts. Denies hallucinations.  
  Problem: Anxiety  Goal: Will report anxiety at manageable levels  Description: INTERVENTIONS:  1. Administer medication as ordered  2. Teach and rehearse alternative coping skills  3. Provide emotional support with 1:1 interaction with staff  Outcome: Progressing  Flowsheets (Taken 4/12/2024 1343)  Will report anxiety at manageable levels: Administer medication as ordered     Problem: Decision Making  Goal: Pt/Family able to effectively weigh alternatives and participate in decision making related to treatment and care  Description: INTERVENTIONS:  1. Determine when there are differences between patient's view, family's view, and healthcare provider's view of condition  2. Facilitate patient and family articulation of goals for care  3. Help patient and family identify pros/cons of alternative solutions  4. Provide information as requested by patient/family  5. Respect patient/family right to receive or not to receive information  6. Serve as a liaison between patient and family and health care team  7. Initiate Consults from Ethics, Palliative Care or initiate Family Care Conference as is appropriate  Outcome: Progressing  Flowsheets (Taken 4/12/2024 1343)  Patient/family able to effectively weigh alternatives and participate in decision making related to treatment and care: Determine when there are differences between patient's view, family's view, and healthcare provider's view of condition     
  Problem: Risk for Elopement  Goal: Patient will not exit the unit/facility without proper excort  Outcome: Progressing     Problem: Anxiety  Goal: Will report anxiety at manageable levels  Description: INTERVENTIONS:  1. Administer medication as ordered  2. Teach and rehearse alternative coping skills  3. Provide emotional support with 1:1 interaction with staff  4/13/2024 2136 by Tash Fisher, RN  Outcome: Progressing  4/13/2024 1636 by Gaby León, RN  Outcome: Progressing     Problem: Decision Making  Goal: Pt/Family able to effectively weigh alternatives and participate in decision making related to treatment and care  Description: INTERVENTIONS:  1. Determine when there are differences between patient's view, family's view, and healthcare provider's view of condition  2. Facilitate patient and family articulation of goals for care  3. Help patient and family identify pros/cons of alternative solutions  4. Provide information as requested by patient/family  5. Respect patient/family right to receive or not to receive information  6. Serve as a liaison between patient and family and health care team  7. Initiate Consults from Ethics, Palliative Care or initiate Family Care Conference as is appropriate  Outcome: Progressing     Problem: Behavior  Goal: Pt/Family maintain appropriate behavior and adhere to behavioral management agreement, if implemented  Description: INTERVENTIONS:  1. Assess patient/family's coping skills and  non-compliant behavior (including use of illegal substances)  2. Notify security of behavior or suspected illegal substances which indicate the need for search of the family and/or belongings  3. Encourage verbalization of thoughts and concerns in a socially appropriate manner  4. Utilize positive, consistent limit setting strategies supporting safety of patient, staff and others  5. Encourage participation in the decision making process about the behavioral management agreement  6. 
  Problem: Risk for Elopement  Goal: Patient will not exit the unit/facility without proper excort  Outcome: Progressing     Problem: Depression/Self Harm  Goal: Effect of psychiatric condition will be minimized and patient will be protected from self harm  Description: INTERVENTIONS:  1. Assess impact of patient's symptoms on level of functioning, self care needs and offer support as indicated  2. Assess patient/family knowledge of depression, impact on illness and need for teaching  3. Provide emotional support, presence and reassurance  4. Assess for possible suicidal thoughts or ideation. If patient expresses suicidal thoughts or statements do not leave alone, initiate Suicide Precautions, move to a room close to the nursing station and obtain sitter  5. Initiate consults as appropriate with Mental Health Professional, Spiritual Care, Psychosocial CNS, and consider a recommendation to the [facility]IP for a Psychiatric Consultation  Outcome: Progressing     Problem: Anxiety  Goal: Will report anxiety at manageable levels  Description: INTERVENTIONS:  1. Administer medication as ordered  2. Teach and rehearse alternative coping skills  3. Provide emotional support with 1:1 interaction with staff  Outcome: Not Progressing     Problem: Decision Making  Goal: Pt/Family able to effectively weigh alternatives and participate in decision making related to treatment and care  Description: INTERVENTIONS:  1. Determine when there are differences between patient's view, family's view, and healthcare provider's view of condition  2. Facilitate patient and family articulation of goals for care  3. Help patient and family identify pros/cons of alternative solutions  4. Provide information as requested by patient/family  5. Respect patient/family right to receive or not to receive information  6. Serve as a liaison between patient and family and health care team  7. Initiate Consults from Ethics, Palliative Care or initiate Family Care 
[facility]  Initial Interdisciplinary Treatment Plan NOTE    Review Date & Time:  4/11/24 1100    Patient was in treatment team    Admission Type:   Admission Type: Involuntary    Reason for admission:  Reason for Admission: \"My Dr put it as psychosis, but I don't know. I really don't want to talk about it.\"      Estimated Length of Stay Update:   5 days  Estimated Discharge Date Update:  MONDAY    EDUCATION:   Learner Progress Toward Treatment Goals: Reviewed results and recommendations of this team    Method: Small group    Outcome: Needs reinforcement    PATIENT GOALS: NONE REPORTED    PLAN/TREATMENT RECOMMENDATIONS UPDATE: SUICIDE PRECAUTIONS, ASSESS FOR THOUGHTS OF HARM TO OTHERS, MEDICATIONS, GROUPS, ASSESS FOR PSYCHOSIS, SUPPORTIVE CARE, COLLATERAL INFORMATION, DISCHARGE PLANNING AND FOLLOW UP    GOALS UPDATE:   Time frame for Short-Term Goals:  5 DAYS    Jeniffer Russo RN      
Pt was asleep in bed with unlabored respirations at onset of shift.  Remains on close observation staggered q 15 min checks.    
Involuntary Admit  Goal: Will cooperate with staff recommendations and doctor's orders and will demonstrate appropriate behavior  Description: INTERVENTIONS:  1. Treat underlying conditions and offer medication as ordered  2. Educate regarding involuntary admission procedures and rules  3. Contain excessive/inappropriate behavior per unit and hospital policies  4/11/2024 1433 by Jeniffer Russo, RN  Outcome: Not Progressing         Pt is isolative  to room. He is taking meds not going to groups. Denied suicidal/homicidal thoughts. Hearing voices but doesn't know what they are saying.

## 2024-04-15 NOTE — PROGRESS NOTES
BEHAVIORAL HEALTH FOLLOW-UP NOTE     4/12/2024     Patient was seen and examined in person, Chart reviewed   Patient's case discussed with staff/team    Chief Complaint: \"I am feeling a bit better.\"    Interim History:   Patient came into treatment team today he states that he is feeling \"a bit better.\"  He states he is not having as much as a thought insertion or thought deletion staff indicates that he denies suicidal ideations intent or plan denies auditory or visual hallucinations overall states that she feels as though he is improving.  States the Trileptal seems to be working well for him.  He is encouraged to attend groups.  Less irritability, less agitation today appears much less anxious and more calm         Appetite: [x] Normal/Unchanged  [] Increased  [] Decreased      Sleep:       [x] Normal/Unchanged  [] Fair       [] Poor              Energy:    [x] Normal/Unchanged  [] Increased  [] Decreased        SI [] Present  [x] Absent    HI  []Present  [x] Absent     Aggression:  [] yes  [x] no    Patient is [x] able  [] unable to CONTRACT FOR SAFETY     PAST MEDICAL/PSYCHIATRIC HISTORY:   Past Medical History:   Diagnosis Date    Depression        FAMILY/SOCIAL HISTORY:  No family history on file.  Social History     Socioeconomic History    Marital status: Single     Spouse name: Not on file    Number of children: Not on file    Years of education: Not on file    Highest education level: Not on file   Occupational History    Not on file   Tobacco Use    Smoking status: Every Day     Current packs/day: 0.25     Average packs/day: 0.3 packs/day for 4.3 years (1.1 ttl pk-yrs)     Types: Cigarettes     Start date: 2020    Smokeless tobacco: Never   Vaping Use    Vaping Use: Every day    Substances: Unknown   Substance and Sexual Activity    Alcohol use: Not Currently    Drug use: Not Currently    Sexual activity: Not Currently   Other Topics Concern    Not on file   Social History Narrative    Not on file 
BEHAVIORAL HEALTH FOLLOW-UP NOTE     4/13/2024     Patient was seen and examined in person, Chart reviewed   Patient's case discussed with staff/team    Chief Complaint: \"I doing a lot better and my sleep is really good\"    Interim History:   Remington is resting in his room this morning.  He states the medications are working well for him.  He states that he is \"a lot better\" he states that \"sleep is really good\" he is pleasant cooperative makes good eye contact.  He does inquire about when he may potentially be discharged.  He is eating well sleeping well taking the medication.  He is not irritable or guarded.  Insight and judgment are improving.  He has been coming out of his room and attending groups he is pleasant and appropriate with peers and staff.         Appetite: [x] Normal/Unchanged  [] Increased  [] Decreased      Sleep:       [x] Normal/Unchanged  [] Fair       [] Poor              Energy:    [x] Normal/Unchanged  [] Increased  [] Decreased        SI [] Present  [x] Absent    HI  []Present  [x] Absent     Aggression:  [] yes  [x] no    Patient is [x] able  [] unable to CONTRACT FOR SAFETY     PAST MEDICAL/PSYCHIATRIC HISTORY:   Past Medical History:   Diagnosis Date    Depression        FAMILY/SOCIAL HISTORY:  No family history on file.  Social History     Socioeconomic History    Marital status: Single     Spouse name: Not on file    Number of children: Not on file    Years of education: Not on file    Highest education level: Not on file   Occupational History    Not on file   Tobacco Use    Smoking status: Every Day     Current packs/day: 0.25     Average packs/day: 0.3 packs/day for 4.3 years (1.1 ttl pk-yrs)     Types: Cigarettes     Start date: 2020    Smokeless tobacco: Never   Vaping Use    Vaping Use: Every day    Substances: Unknown   Substance and Sexual Activity    Alcohol use: Not Currently    Drug use: Not Currently    Sexual activity: Not Currently   Other Topics Concern    Not on file 
[facility]  Discharge Note    Pt discharged with followings belongings:   Dental Appliances: None  Vision - Corrective Lenses: None  Hearing Aid: None  Jewelry: None  Body Piercings Removed: N/A  Clothing: Pants, Shirt, Socks, Undergarments   Valuables sent home with patient, along with safety plan, filled prescription and copy of AVS. All personal belongings from locker were returned to patient. Patient educated on aftercare instructions:  completed, reviewed.Copy given to pt..  [facility] . Patient verbalize understanding of AVS:  yes with stated potential to comply to same.    Status EXAM upon discharge:  Mental Status and Behavioral Exam    Level of Assistance: Independent/Self  Facial Expression: Avoids Gaze  Affect: Blunted  Level of Consciousness: Alert  Frequency of Checks: 4 times per hour, close  Mood: pleasant  Motor Activity:Normal: Yes  Motor Activity: Other (comment) (WNL)  Eye Contact: Fair  Observed Behavior: Cooperative  Sexual Misconduct History: Current - no  Preception: [place] to person, [place] to time, [place] to place, [place] to situation  Attention:Normal: Yes  Attention: Others (comment) (IMPROVED)  Thought Processes: Other (comment) (MORE ORGANIZED, RELEVANT)  Thought Content:Normal: yes  Depression Symptoms: No problems reported or observed.  Anxiety Symptoms: Generalized, minimal  Moni Symptoms: No problems reported or observed.  Hallucinations: None  Delusions: No  Delusions: Other (comment) (N/A)  Memory:Normal: Yes  Memory: Other (comment) (IMPROVED)  Insight and Judgment: improved      Tobacco Screening:  Practical Counseling, on admission, chirag X, if applicable and completed (first 3 are required if patient doesn't refuse):            ( X) Recognizing danger situations (included triggers and roadblocks)                    ( X) Coping skills (new ways to manage stress,relaxation techniques, changing 
CLINICAL PHARMACY NOTE: MEDS TO BEDS    Total # of Prescriptions Filled: 1   The following medications were delivered to the patient:  Oxcarbazepine 300 mg    Additional Documentation:   Delivered meds to Jeniffer rn on the unit  
Leisure assessment completed.  
Patient declined invitation to the following groups:    Community Meeting  Education  Spiritual Care    Patient will continue to be provided with opportunities to enhance leisure skills/interests and/or coping mechanisms.  
Patient denies suicidal ideation, homicidal ideations and AVH.  Patient denies anxiety and depression.  Patient states he is feeling \"great.\"  Patient appears flat and anxious but brightens with conversation and shares good eye contact.  Presents calm and cooperative during assessment.  Patient is out on the unit and is social with peers.  Medications taken without issue.  No complaints or concerns verbalized at this time.  No unit problems reported.  Will continue to observe and support.    
Patient is resting quietly in bed with eyes closed at this time.  No signs of distress or discomfort noted.  No PRN medications given thus far.  Safety needs met.  No unit problems reported.  Purposeful rounding continued.    
Pt attended afternoon smoking cessation group. Pt appeared to be an active listener. Pt is able to share appropriately when prompted and asked facilitator relevant questions.  Pt was participant 1 of 11.    Electronically signed by Margarita Winchester on 4/12/2024 at 3:22 PM      
[facility]. Spoke with Diandra.     Pt had Invega Trenza 819mg IM [id number] last dose on 2/12/24.     5/6 appointment Brittany Carlton NP going to lower dose 400mg at that appointment. Diandra would like discharge instructions faxed to [facility] upon discharge.   
04/10/2024 06:47 PM    LABMETH NEGATIVE 04/10/2024 06:47 PM    OPIATESCREENURINE NOT DETECTED 07/17/2019 09:18 PM    PHENCYCLIDINESCREENURINE NOT DETECTED 07/17/2019 09:18 PM    ETOH <10 07/17/2019 09:18 PM     No results found for: \"TSH\", \"FREET4\"  No results found for: \"LITHIUM\"  No results found for: \"VALPROATE\", \"CBMZ\"        Treatment Plan:  Reviewed current Medications with the patient.   Risks, benefits, side effects, drug-to-drug interactions and alternatives to treatment were discussed.  Collateral information:   CD evaluation  Encourage patient to attend group and other milieu activities.  Discharge planning discussed with the patient and treatment team.      Increase Trileptal 300 mg twice daily  Patient is also on the Invega Trinza every 3 monthly injection    PSYCHOTHERAPY/COUNSELING:  [x] Therapeutic interview  [x] Supportive  [] CBT  [] Ongoing  [] Other    [x] Patient continues to need, on a daily basis, active treatment furnished directly by or requiring the supervision of inpatient psychiatric personnel      Anticipated Length of stay: 3 to 7 days based on stability            Electronically signed by YUN Benedict CNP on 4/14/2024 at 12:12 PM
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
## 2024-04-15 NOTE — DISCHARGE SUMMARY
no  Patient is [x] able  [] unable to CONTRACT FOR SAFETY   Medication side effects(SE):  [x] None(Psych. Meds.) [] Other      Mental Status Examination on discharge:    Level of consciousness:  within normal limits   Appearance:  well-appearing  Behavior/Motor:  no abnormalities noted  Attitude toward examiner:  attentive and good eye contact  Speech:  spontaneous, normal rate and normal volume   Mood: \"My mood is good.\"  Affect: Appropriate and pleasant  Thought processes: Linear without flight of ideas loose associations  Thought content: Devoid of any auditory visual hallucinations delusions or other perceptual normalities.  Denies suicidal homicidal ideations intent or plan  Cognition:  oriented to person, place, and time   Concentration intact  Memory intact  Insight good   Judgement fair   Fund of Knowledge adequate      ASSESSMENT:  Patient symptoms are:  [x] Well controlled  [x] Improving  [] Worsening  [] No change    Reason for more than one antipsychotic:  [x] N/A  [] 3 Failed Monotherapy attempts (Drugs tried:)  [] Crossover to a new antipsychotic  [] Taper to Monotherapy from Polypharmacy  [] Augmentation of clozapine therapy due to treatment resistance to single therapy    Diagnosis:  Principal Problem:    Psychotic disorder with hallucinations (HCC)  Resolved Problems:    * No resolved hospital problems. *      LABS:    No results for input(s): \"WBC\", \"HGB\", \"PLT\" in the last 72 hours.  No results for input(s): \"NA\", \"K\", \"CL\", \"CO2\", \"BUN\", \"CREATININE\", \"GLUCOSE\" in the last 72 hours.  No results for input(s): \"BILITOT\", \"ALKPHOS\", \"AST\", \"ALT\" in the last 72 hours.  Lab Results   Component Value Date/Time    LABAMPH NOT DETECTED 07/17/2019 09:18 PM    BARBSCNU NEGATIVE 04/10/2024 06:47 PM    LABBENZ NEGATIVE 04/10/2024 06:47 PM    LABMETH NEGATIVE 04/10/2024 06:47 PM    OPIATESCREENURINE NOT DETECTED 07/17/2019 09:18 PM    PHENCYCLIDINESCREENURINE NOT DETECTED 07/17/2019 09:18 PM    ETOH <10

## 2024-04-15 NOTE — TRANSITION OF CARE
Calculation (Bazett) 417 ms    P Axis 22 degrees    R Axis 5 degrees    T Axis 29 degrees       Immunizations administered during this encounter:   Immunization History   Administered Date(s) Administered    TDaP, ADACEL (age 10y-64y), BOOSTRIX (age 10y+), IM, 0.5mL 08/27/2014     None of the above/Not documented/Unable to determine from medical record documentation    Screening for Metabolic Disorders for Patients on Antipsychotic Medications  (Data obtained from the EMR)    Estimated Body Mass Index  Body mass index is 27.32 kg/m².      Vital Signs/Blood Pressure  /67   Pulse 65   Temp 97.7 °F (36.5 °C) (Temporal)   Resp 15   Ht 1.753 m (5' 9\")   Wt 83.9 kg (185 lb)   SpO2 98%   BMI 27.32 kg/m²      Fasting Blood Glucose or Hemoglobin A1c  No results found for: \"GLU\", \"GLUCPOC\"    Hemoglobin A1C   Date Value Ref Range Status   04/11/2024 5.0 4.0 - 5.6 % Final       Discharge Diagnosis: PSYCHOTIC DISORDER WITH HALLUCINATIONS    Discharge Plan/Destination:  HOME    Discharge Medication List and Instructions:      Medication List        START taking these medications      OXcarbazepine 300 MG tablet  Commonly known as: TRILEPTAL  Take 1 tablet by mouth 2 times daily            CHANGE how you take these medications      Invega Trinza 819 MG/2.63ML Leslie IM injection  Generic drug: paliperidone palmitate ER  What changed: Another medication with the same name was removed. Continue taking this medication, and follow the directions you see here.            STOP taking these medications      escitalopram 10 MG tablet  Commonly known as: LEXAPRO     naproxen 500 MG tablet  Commonly known as: NAPROSYN               Where to Get Your Medications        These medications were sent to [facility] [address] - P [phone] - F [phone]  [facility], [facility] [address]      Phone: [phone]   OXcarbazepine 300 MG tablet         Unresulted Labs (24h ago, onward)
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
## 2024-10-11 ENCOUNTER — HOSPITAL ENCOUNTER (EMERGENCY)
Age: 29
Discharge: PSYCHIATRIC OR SUBSTANCE USE FACILITY | End: 2024-10-12
Attending: STUDENT IN AN ORGANIZED HEALTH CARE EDUCATION/TRAINING PROGRAM
Payer: COMMERCIAL

## 2024-10-11 DIAGNOSIS — Z76.89 ENCOUNTER FOR PSYCHIATRIC ASSESSMENT: Primary | ICD-10-CM

## 2024-10-11 LAB
ALBUMIN SERPL-MCNC: 4.7 G/DL (ref 3.5–5.2)
ALP SERPL-CCNC: 73 U/L (ref 40–129)
ALT SERPL-CCNC: 45 U/L (ref 0–40)
AMPHET UR QL SCN: NEGATIVE
ANION GAP SERPL CALCULATED.3IONS-SCNC: 14 MMOL/L (ref 7–16)
APAP SERPL-MCNC: <5 UG/ML (ref 10–30)
AST SERPL-CCNC: 179 U/L (ref 0–39)
BARBITURATES UR QL SCN: NEGATIVE
BASOPHILS # BLD: 0.09 K/UL (ref 0–0.2)
BASOPHILS NFR BLD: 1 % (ref 0–2)
BENZODIAZ UR QL: NEGATIVE
BILIRUB SERPL-MCNC: 1.3 MG/DL (ref 0–1.2)
BUN SERPL-MCNC: 12 MG/DL (ref 6–20)
BUPRENORPHINE UR QL: NEGATIVE
CALCIUM SERPL-MCNC: 9.1 MG/DL (ref 8.6–10.2)
CANNABINOIDS UR QL SCN: NEGATIVE
CHLORIDE SERPL-SCNC: 104 MMOL/L (ref 98–107)
CO2 SERPL-SCNC: 22 MMOL/L (ref 22–29)
COCAINE UR QL SCN: NEGATIVE
CREAT SERPL-MCNC: 1 MG/DL (ref 0.7–1.2)
EKG ATRIAL RATE: 78 BPM
EKG P AXIS: 26 DEGREES
EKG P-R INTERVAL: 128 MS
EKG Q-T INTERVAL: 386 MS
EKG QRS DURATION: 80 MS
EKG QTC CALCULATION (BAZETT): 440 MS
EKG R AXIS: 16 DEGREES
EKG T AXIS: 43 DEGREES
EKG VENTRICULAR RATE: 78 BPM
EOSINOPHIL # BLD: 0.01 K/UL (ref 0.05–0.5)
EOSINOPHILS RELATIVE PERCENT: 0 % (ref 0–6)
ERYTHROCYTE [DISTWIDTH] IN BLOOD BY AUTOMATED COUNT: 11.8 % (ref 11.5–15)
ETHANOLAMINE SERPL-MCNC: <10 MG/DL (ref 0–0.08)
FENTANYL UR QL: NEGATIVE
GFR, ESTIMATED: >90 ML/MIN/1.73M2
GLUCOSE SERPL-MCNC: 97 MG/DL (ref 74–99)
HCT VFR BLD AUTO: 43.4 % (ref 37–54)
HGB BLD-MCNC: 15.1 G/DL (ref 12.5–16.5)
IMM GRANULOCYTES # BLD AUTO: <0.03 K/UL (ref 0–0.58)
IMM GRANULOCYTES NFR BLD: 0 % (ref 0–5)
LYMPHOCYTES NFR BLD: 1.77 K/UL (ref 1.5–4)
LYMPHOCYTES RELATIVE PERCENT: 19 % (ref 20–42)
MCH RBC QN AUTO: 31.6 PG (ref 26–35)
MCHC RBC AUTO-ENTMCNC: 34.8 G/DL (ref 32–34.5)
MCV RBC AUTO: 90.8 FL (ref 80–99.9)
METHADONE UR QL: NEGATIVE
MONOCYTES NFR BLD: 0.62 K/UL (ref 0.1–0.95)
MONOCYTES NFR BLD: 7 % (ref 2–12)
NEUTROPHILS NFR BLD: 73 % (ref 43–80)
NEUTS SEG NFR BLD: 6.91 K/UL (ref 1.8–7.3)
OPIATES UR QL SCN: NEGATIVE
OXYCODONE UR QL SCN: NEGATIVE
PCP UR QL SCN: NEGATIVE
PLATELET # BLD AUTO: 280 K/UL (ref 130–450)
PMV BLD AUTO: 9.6 FL (ref 7–12)
POTASSIUM SERPL-SCNC: 4.2 MMOL/L (ref 3.5–5)
PROT SERPL-MCNC: 7.6 G/DL (ref 6.4–8.3)
RBC # BLD AUTO: 4.78 M/UL (ref 3.8–5.8)
SALICYLATES SERPL-MCNC: 0.3 MG/DL (ref 0–30)
SODIUM SERPL-SCNC: 140 MMOL/L (ref 132–146)
TEST INFORMATION: NORMAL
TOXIC TRICYCLIC SC,BLOOD: NEGATIVE
WBC OTHER # BLD: 9.4 K/UL (ref 4.5–11.5)

## 2024-10-11 PROCEDURE — 80179 DRUG ASSAY SALICYLATE: CPT

## 2024-10-11 PROCEDURE — 93005 ELECTROCARDIOGRAM TRACING: CPT | Performed by: STUDENT IN AN ORGANIZED HEALTH CARE EDUCATION/TRAINING PROGRAM

## 2024-10-11 PROCEDURE — 6370000000 HC RX 637 (ALT 250 FOR IP): Performed by: STUDENT IN AN ORGANIZED HEALTH CARE EDUCATION/TRAINING PROGRAM

## 2024-10-11 PROCEDURE — 80143 DRUG ASSAY ACETAMINOPHEN: CPT

## 2024-10-11 PROCEDURE — 80053 COMPREHEN METABOLIC PANEL: CPT

## 2024-10-11 PROCEDURE — 85025 COMPLETE CBC W/AUTO DIFF WBC: CPT

## 2024-10-11 PROCEDURE — 80307 DRUG TEST PRSMV CHEM ANLYZR: CPT

## 2024-10-11 PROCEDURE — G0480 DRUG TEST DEF 1-7 CLASSES: HCPCS

## 2024-10-11 PROCEDURE — 99285 EMERGENCY DEPT VISIT HI MDM: CPT

## 2024-10-11 RX ORDER — LORAZEPAM 1 MG/1
2 TABLET ORAL ONCE
Status: COMPLETED | OUTPATIENT
Start: 2024-10-11 | End: 2024-10-11

## 2024-10-11 RX ADMIN — LORAZEPAM 2 MG: 1 TABLET ORAL at 22:38

## 2024-10-11 ASSESSMENT — LIFESTYLE VARIABLES
HOW OFTEN DO YOU HAVE A DRINK CONTAINING ALCOHOL: MONTHLY OR LESS
HOW MANY STANDARD DRINKS CONTAINING ALCOHOL DO YOU HAVE ON A TYPICAL DAY: PATIENT UNABLE TO ANSWER

## 2024-10-11 ASSESSMENT — PAIN - FUNCTIONAL ASSESSMENT
PAIN_FUNCTIONAL_ASSESSMENT: NONE - DENIES PAIN
PAIN_FUNCTIONAL_ASSESSMENT: NONE - DENIES PAIN

## 2024-10-11 NOTE — ED PROVIDER NOTES
[facility] EMERGENCY DEPARTMENT  EMERGENCY DEPARTMENT ENCOUNTER        Pt Name: Remington Colvin  MRN: [id number]  Birthdate 1995  Date of evaluation: 10/11/2024  Provider: Lizzy Vicente MD  PCP: Caleb Cool DO  Note Started: 5:29 PM EDT 10/11/24    HPI  28 y.o. male presenting for psych evaluation.  Patient on involuntary report from psych NP at any [facility] for [facility].  Per patient complains of racing thoughts.  She denies SI or HI to me.  He denies hallucinations to me.  He states his last Invega was 2 months ago as they were switching medications.  He does admit to a wine cooler today.  He states he does not drink every day.      --------------------------------------------- PAST HISTORY ---------------------------------------------  Past Medical History:  has a past medical history of Depression.    Past Surgical History:  has no past surgical history on file.    Social History:  reports that he has been smoking cigarettes. He started smoking about 4 years ago. He has a 1.2 pack-year smoking history. He has never used smokeless tobacco. He reports current alcohol use. He reports that he does not currently use drugs.    Family History: family history is not on file.     The patient’s home medications have been reviewed.    Allergies: Patient has no known allergies.      Review of Systems   Psychiatric/Behavioral:  Negative for hallucinations and suicidal ideas.    All other systems reviewed and are negative.       Physical Exam  Constitutional:       General: He is not in acute distress.     Appearance: Normal appearance. He is not ill-appearing.   HENT:      Head: Normocephalic and atraumatic.      Right Ear: External ear normal.      Left Ear: External ear normal.      Nose: Nose normal.   Eyes:      Conjunctiva/sclera: Conjunctivae normal.   Cardiovascular:      Rate and Rhythm: Normal rate and regular rhythm.   Pulmonary:      Effort: Pulmonary effort  rhythm, no STEMI, normal intervals, appears grossly stable compared to most recent EKG        Is this patient to be included in the SEP-1 core measure? No Exclusion criteria - the patient is NOT to be included for SEP-1 Core Measure due to: Infection is not suspected      ED Course as of 10/11/24 1846   Fri Oct 11, 2024   1846 CBC with Auto Differential(!):    WBC 9.4   RBC 4.78   Hemoglobin Quant 15.1   Hematocrit 43.4   MCV 90.8   MCH 31.6   MCHC 34.8(!)   RDW 11.8   Platelet Count 280   MPV 9.6   Neutrophils % 73   Lymphocyte % 19(!)   Monocytes % 7   Eosinophils % 0   Basophils % 1   Immature Granulocytes % 0   Neutrophils Absolute 6.91   Lymphocytes Absolute 1.77   Monocytes Absolute 0.62   Eosinophils Absolute 0.01(!)   Basophils Absolute 0.09   Immature Granulocytes Absolute <0.03  No leukocytosis or anemia [AP]   1846 Comprehensive Metabolic Panel w/ Reflex to MG(!):    Sodium 140   Potassium 4.2   Chloride 104   CARBON DIOXIDE 22   Anion Gap 14   Glucose 97   BUN,BUNPL 12   Creatinine 1.0   Est, Glom Filt Rate >90   Calcium 9.1   Total Protein 7.6   Albumin 4.7   Total Bilirubin 1.3(!)   Alkaline Phosphatase 73   ALT 45(!)   (!)  Electrolytes within normal limits [AP]   1846 URINE DRUG SCREEN:    Amphetamine Screen, Ur NEGATIVE   Barbiturate Screen, Ur NEGATIVE   Benzodiazepine Screen, Urine NEGATIVE   Cocaine Metabolite, Urine NEGATIVE   Methadone Screen, Urine NEGATIVE   Opiates, Urine NEGATIVE   Phencyclidine, Urine NEGATIVE   Cannabinoid Scrn, Ur NEGATIVE   Oxycodone Screen, Ur NEGATIVE   Fentanyl, Ur NEGATIVE   Buprenorphine Urine NEGATIVE   TEST INFORMATION These drug screen results are for medical purposes only and should not be considered definitive or confirmed.  Negative [AP]   1846 Serum Drug Screen(!):    Acetaminophen Level <5(!)   Ethanol Lvl <10   Salicyclic Acid 0.3   Toxic Tricyclic Sc,Blood NEGATIVE  Negative [AP]      ED Course User Index  [AP] Lizzy Vicente MD
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
## 2024-10-12 VITALS
DIASTOLIC BLOOD PRESSURE: 83 MMHG | OXYGEN SATURATION: 98 % | TEMPERATURE: 98.2 F | HEART RATE: 81 BPM | RESPIRATION RATE: 16 BRPM | BODY MASS INDEX: 27.32 KG/M2 | HEIGHT: 69 IN | SYSTOLIC BLOOD PRESSURE: 131 MMHG

## 2024-10-12 LAB
BILIRUB UR QL STRIP: NEGATIVE
CLARITY UR: CLEAR
COLOR UR: YELLOW
GLUCOSE UR STRIP-MCNC: NEGATIVE MG/DL
HGB UR QL STRIP.AUTO: NEGATIVE
KETONES UR STRIP-MCNC: NEGATIVE MG/DL
LEUKOCYTE ESTERASE UR QL STRIP: NEGATIVE
NITRITE UR QL STRIP: NEGATIVE
PH UR STRIP: 6 [PH] (ref 5–9)
PROT UR STRIP-MCNC: NEGATIVE MG/DL
RBC #/AREA URNS HPF: ABNORMAL /HPF
SP GR UR STRIP: >1.03 (ref 1–1.03)
UROBILINOGEN UR STRIP-ACNC: 0.2 EU/DL (ref 0–1)
WBC #/AREA URNS HPF: ABNORMAL /HPF

## 2024-10-12 PROCEDURE — 81001 URINALYSIS AUTO W/SCOPE: CPT

## 2024-10-12 NOTE — ED NOTES
Patient responds to his name being called multiple times and light touch. Patient updated on acceptance to [facility] pending UA. Notified patient that a new urine specimen is required before he can be transferred, patent verbalized understanding, specimen cup provided.

## 2024-10-12 NOTE — ED NOTES
Per [facility] notes patient Accepted to [facility] by Dr. Josiah Washington to [facility].  Nurse to nurse report to be called to [phone].  Transportation ETA pending.      Address to accepting facility: [address] [place] [address] [place] [address]

## 2024-10-12 NOTE — ED NOTES
Received call from Ana Maria at [facility] who stated [facility] will accept patient but not until UA is resulted.

## 2024-10-12 NOTE — ED NOTES
Spoke to Trey in lab who stated that UA cannot be ordered as add-on because it can only be added on if it has been in lab less than 4 hours and this patient's UDS was collected at 1754.

## 2024-10-12 NOTE — ED NOTES
Received call from Keesha Machado who stated she is patient's mother and she asked if patient was being transferred to [facility]. Found signed TANNER in patient's paper chart. Update given that patient was referred to [facility].

## 2024-10-12 NOTE — ED NOTES
Patient states he was already given a copy of involuntary hold. Patient read and signed RIGHTS OF AN INVOLUNTARY DETAINED PERSON ([place]) form. Signature witnessed by this RN and form placed in patient's paper chart.

## 2024-10-12 NOTE — ED NOTES
Patient gave mother his cell phone, cash from his wallet, his credit card and bank card to take home.

## 2024-10-12 NOTE — ED NOTES
Received call from Nate RN at [facility] who stated that EKG needs faxed to [phone] and that a UA is required. She stated [facility] in on the line requesting an update on patient's behavior. She connected this RN to call and this RN gave update to  Héctor at [facility].

## 2024-10-12 NOTE — ED NOTES
Susan stated patient was referred to the [facility] due to patient requested to go to [facility].

## 2024-10-12 NOTE — BH NOTE
Patient has been medically cleared, SW called [facility] to refer her to [facility] per patient's preference.

## 2024-10-14 LAB
EKG ATRIAL RATE: 78 BPM
EKG P AXIS: 26 DEGREES
EKG P-R INTERVAL: 128 MS
EKG Q-T INTERVAL: 386 MS
EKG QRS DURATION: 80 MS
EKG QTC CALCULATION (BAZETT): 440 MS
EKG R AXIS: 16 DEGREES
EKG T AXIS: 43 DEGREES
EKG VENTRICULAR RATE: 78 BPM

## 2024-10-14 PROCEDURE — 93010 ELECTROCARDIOGRAM REPORT: CPT | Performed by: INTERNAL MEDICINE
